# Patient Record
Sex: FEMALE | Race: WHITE | Employment: UNEMPLOYED
[De-identification: names, ages, dates, MRNs, and addresses within clinical notes are randomized per-mention and may not be internally consistent; named-entity substitution may affect disease eponyms.]

---

## 2017-01-06 ENCOUNTER — TELEPHONE (OUTPATIENT)
Dept: FAMILY MEDICINE CLINIC | Facility: CLINIC | Age: 52
End: 2017-01-06

## 2017-02-01 ENCOUNTER — OFFICE VISIT (OUTPATIENT)
Dept: FAMILY MEDICINE CLINIC | Facility: CLINIC | Age: 52
End: 2017-02-01

## 2017-02-01 VITALS
WEIGHT: 200.6 LBS | DIASTOLIC BLOOD PRESSURE: 88 MMHG | HEART RATE: 86 BPM | SYSTOLIC BLOOD PRESSURE: 132 MMHG | RESPIRATION RATE: 17 BRPM | TEMPERATURE: 98.5 F | HEIGHT: 64 IN | BODY MASS INDEX: 34.25 KG/M2

## 2017-02-01 DIAGNOSIS — G43.009 MIGRAINE WITHOUT AURA AND WITHOUT STATUS MIGRAINOSUS, NOT INTRACTABLE: ICD-10-CM

## 2017-02-01 DIAGNOSIS — E04.9 ENLARGED THYROID: ICD-10-CM

## 2017-02-01 DIAGNOSIS — F43.9 STRESS AT HOME: ICD-10-CM

## 2017-02-01 DIAGNOSIS — Z00.00 WELL ADULT EXAM: ICD-10-CM

## 2017-02-01 DIAGNOSIS — Z12.31 SCREENING MAMMOGRAM, ENCOUNTER FOR: ICD-10-CM

## 2017-02-01 DIAGNOSIS — K21.9 GASTROESOPHAGEAL REFLUX DISEASE WITHOUT ESOPHAGITIS: Primary | ICD-10-CM

## 2017-02-01 DIAGNOSIS — R00.2 PALPITATION: ICD-10-CM

## 2017-02-01 PROCEDURE — 99204 OFFICE O/P NEW MOD 45 MIN: CPT | Performed by: NURSE PRACTITIONER

## 2017-02-01 RX ORDER — OMEPRAZOLE 20 MG/1
20 TABLET, DELAYED RELEASE ORAL DAILY
Qty: 30 TABLET | Refills: 3 | Status: SHIPPED | OUTPATIENT
Start: 2017-02-01 | End: 2017-06-09 | Stop reason: SDUPTHER

## 2017-02-01 RX ORDER — CITALOPRAM 20 MG/1
20 TABLET ORAL DAILY
Qty: 30 TABLET | Refills: 3 | Status: SHIPPED | OUTPATIENT
Start: 2017-02-01 | End: 2017-06-08 | Stop reason: SDUPTHER

## 2017-02-01 RX ORDER — ATENOLOL 50 MG/1
TABLET ORAL
Qty: 30 TABLET | Refills: 3 | Status: SHIPPED | OUTPATIENT
Start: 2017-02-01 | End: 2017-06-08 | Stop reason: SDUPTHER

## 2017-02-01 ASSESSMENT — ENCOUNTER SYMPTOMS
PHOTOPHOBIA: 1
EYE PAIN: 0
COUGH: 0
WHEEZING: 0
TROUBLE SWALLOWING: 0
SHORTNESS OF BREATH: 0
DIARRHEA: 0
ABDOMINAL PAIN: 0
BACK PAIN: 0
VOMITING: 0
NAUSEA: 0
CONSTIPATION: 0
SORE THROAT: 0
EYE DISCHARGE: 0
RHINORRHEA: 0

## 2017-02-01 ASSESSMENT — PATIENT HEALTH QUESTIONNAIRE - PHQ9
1. LITTLE INTEREST OR PLEASURE IN DOING THINGS: 0
SUM OF ALL RESPONSES TO PHQ QUESTIONS 1-9: 0
2. FEELING DOWN, DEPRESSED OR HOPELESS: 0
SUM OF ALL RESPONSES TO PHQ9 QUESTIONS 1 & 2: 0

## 2017-04-06 ENCOUNTER — OFFICE VISIT (OUTPATIENT)
Dept: FAMILY MEDICINE CLINIC | Age: 52
End: 2017-04-06
Payer: MEDICARE

## 2017-04-06 VITALS
OXYGEN SATURATION: 98 % | RESPIRATION RATE: 16 BRPM | TEMPERATURE: 100 F | HEART RATE: 82 BPM | DIASTOLIC BLOOD PRESSURE: 80 MMHG | BODY MASS INDEX: 34.25 KG/M2 | HEIGHT: 64 IN | WEIGHT: 200.62 LBS | SYSTOLIC BLOOD PRESSURE: 130 MMHG

## 2017-04-06 DIAGNOSIS — J01.00 ACUTE NON-RECURRENT MAXILLARY SINUSITIS: ICD-10-CM

## 2017-04-06 DIAGNOSIS — B34.9 VIRAL SYNDROME: Primary | ICD-10-CM

## 2017-04-06 PROCEDURE — 99212 OFFICE O/P EST SF 10 MIN: CPT | Performed by: INTERNAL MEDICINE

## 2017-04-06 RX ORDER — AMOXICILLIN AND CLAVULANATE POTASSIUM 875; 125 MG/1; MG/1
1 TABLET, FILM COATED ORAL 2 TIMES DAILY
Qty: 20 TABLET | Refills: 0 | Status: SHIPPED | OUTPATIENT
Start: 2017-04-06 | End: 2017-04-10 | Stop reason: SINTOL

## 2017-04-06 RX ORDER — FEXOFENADINE HCL 180 MG/1
180 TABLET ORAL DAILY
Qty: 30 TABLET | Refills: 0 | Status: SHIPPED | OUTPATIENT
Start: 2017-04-06 | End: 2018-04-06

## 2017-04-06 ASSESSMENT — ENCOUNTER SYMPTOMS
DIARRHEA: 0
SHORTNESS OF BREATH: 0
RHINORRHEA: 0
COUGH: 1
VOMITING: 0
SORE THROAT: 0
SINUS PRESSURE: 1
NAUSEA: 1
ABDOMINAL PAIN: 0

## 2017-04-10 ENCOUNTER — TELEPHONE (OUTPATIENT)
Dept: FAMILY MEDICINE CLINIC | Age: 52
End: 2017-04-10

## 2017-04-10 DIAGNOSIS — J20.9 ACUTE BRONCHITIS, UNSPECIFIED ORGANISM: Primary | ICD-10-CM

## 2017-04-11 RX ORDER — CEPHALEXIN 500 MG/1
500 CAPSULE ORAL 2 TIMES DAILY
Qty: 14 CAPSULE | Refills: 0 | Status: SHIPPED | OUTPATIENT
Start: 2017-04-11 | End: 2017-04-18

## 2017-06-08 DIAGNOSIS — R00.2 PALPITATION: ICD-10-CM

## 2017-06-08 DIAGNOSIS — F43.9 STRESS AT HOME: ICD-10-CM

## 2017-06-09 DIAGNOSIS — K21.9 GASTROESOPHAGEAL REFLUX DISEASE WITHOUT ESOPHAGITIS: ICD-10-CM

## 2017-06-09 RX ORDER — OMEPRAZOLE 20 MG/1
20 TABLET, DELAYED RELEASE ORAL DAILY
Qty: 30 TABLET | Refills: 2 | Status: SHIPPED | OUTPATIENT
Start: 2017-06-09 | End: 2017-07-27 | Stop reason: SDUPTHER

## 2017-06-09 RX ORDER — CITALOPRAM 20 MG/1
20 TABLET ORAL DAILY
Qty: 30 TABLET | Refills: 2 | Status: SHIPPED | OUTPATIENT
Start: 2017-06-09 | End: 2017-10-03 | Stop reason: SDUPTHER

## 2017-06-09 RX ORDER — ATENOLOL 50 MG/1
50 TABLET ORAL DAILY
Qty: 30 TABLET | Refills: 2 | Status: SHIPPED | OUTPATIENT
Start: 2017-06-09 | End: 2017-10-10 | Stop reason: SDUPTHER

## 2017-07-27 DIAGNOSIS — K21.9 GASTROESOPHAGEAL REFLUX DISEASE WITHOUT ESOPHAGITIS: ICD-10-CM

## 2017-07-28 RX ORDER — NICOTINE POLACRILEX 4 MG/1
20 GUM, CHEWING ORAL DAILY
Qty: 30 TABLET | Refills: 2 | Status: SHIPPED | OUTPATIENT
Start: 2017-07-28 | End: 2017-10-30 | Stop reason: DRUGHIGH

## 2017-07-31 DIAGNOSIS — K21.9 GASTROESOPHAGEAL REFLUX DISEASE WITHOUT ESOPHAGITIS: ICD-10-CM

## 2017-07-31 RX ORDER — OMEPRAZOLE 20 MG/1
20 CAPSULE, DELAYED RELEASE ORAL DAILY
Qty: 30 CAPSULE | Refills: 2 | Status: SHIPPED | OUTPATIENT
Start: 2017-07-31 | End: 2017-10-30 | Stop reason: SDUPTHER

## 2017-10-03 DIAGNOSIS — F43.9 STRESS AT HOME: ICD-10-CM

## 2017-10-03 RX ORDER — CITALOPRAM 20 MG/1
20 TABLET ORAL DAILY
Qty: 30 TABLET | Refills: 1 | Status: SHIPPED | OUTPATIENT
Start: 2017-10-03 | End: 2017-12-18 | Stop reason: SDUPTHER

## 2017-10-03 NOTE — TELEPHONE ENCOUNTER
Last OV 2/1, Last refill Celexa 6/9/17.  BP  Health Maintenance   Topic Date Due    Lipid screen  08/10/2007    Breast cancer screen  08/10/2017    Colon cancer screen colonoscopy  08/10/2017    Flu vaccine (1) 09/01/2017    DTaP/Tdap/Td vaccine (1 - Tdap) 04/06/2018 (Originally 8/10/1986)    Cervical cancer screen  04/06/2018 (Originally 8/10/1988)    Diabetes screen  04/06/2018 (Originally 8/10/2007)    HIV screen  04/06/2018 (Originally 8/10/1982)             (applicable per patient's age: Cancer Screenings, Depression Screening, Fall Risk Screening, Immunizations)    No results found for: LABA1C, LABMICR, LDLCHOLESTEROL, LDLCALC, AST, ALT, BUN   (goal A1C is < 7)   (goal LDL is <100) need 30-50% reduction from baseline     BP Readings from Last 3 Encounters:   04/06/17 130/80   02/01/17 132/88   02/03/16 120/80    (goal /80)      All Future Testing planned in [facility]:  Lab Frequency Next Occurrence   Vitamin D 25 Hydroxy Once 2/1/2017   US Thyroid Once 2/1/2017   TSH With Reflex Ft4 Once 2/1/2017   JESSIE Digital Screen Bilateral Once 3/3/2017       Next Visit Date:  Future Appointments  Date Time Provider Delia Steward   10/11/2017 1:10 PM MAYLIN Huizar [facility]            Patient Active Problem List:     Palpitation     Anxiety and depression     Gastroesophageal reflux disease without esophagitis     Migraine without aura and without status migrainosus, not intractable     Stress at home

## 2017-10-10 DIAGNOSIS — R00.2 PALPITATION: ICD-10-CM

## 2017-10-11 RX ORDER — ATENOLOL 50 MG/1
50 TABLET ORAL DAILY
Qty: 30 TABLET | Refills: 0 | Status: SHIPPED | OUTPATIENT
Start: 2017-10-11 | End: 2017-12-18 | Stop reason: SDUPTHER

## 2017-10-11 NOTE — TELEPHONE ENCOUNTER
Last OV 2/1, Last refill Atenolol 6/9.  BP  Health Maintenance   Topic Date Due    Lipid screen  08/10/2007    Breast cancer screen  08/10/2017    Colon cancer screen colonoscopy  08/10/2017    Flu vaccine (1) 09/01/2017    DTaP/Tdap/Td vaccine (1 - Tdap) 04/06/2018 (Originally 8/10/1986)    Cervical cancer screen  04/06/2018 (Originally 8/10/1988)    Diabetes screen  04/06/2018 (Originally 8/10/2007)    HIV screen  04/06/2018 (Originally 8/10/1982)             (applicable per patient's age: Cancer Screenings, Depression Screening, Fall Risk Screening, Immunizations)    No results found for: LABA1C, LABMICR, LDLCHOLESTEROL, LDLCALC, AST, ALT, BUN   (goal A1C is < 7)   (goal LDL is <100) need 30-50% reduction from baseline     BP Readings from Last 3 Encounters:   04/06/17 130/80   02/01/17 132/88   02/03/16 120/80    (goal /80)      All Future Testing planned in [facility]:  Lab Frequency Next Occurrence   Vitamin D 25 Hydroxy Once 02/01/2017   US Thyroid Once 02/01/2017   TSH With Reflex Ft4 Once 02/01/2017   JESSIE Digital Screen Bilateral Once 03/03/2017       Next Visit Date:  Future Appointments  Date Time Provider Delia Steward   10/17/2017 9:00 AM MAYLIN Hdez [facility]            Patient Active Problem List:     Palpitation     Anxiety and depression     Gastroesophageal reflux disease without esophagitis     Migraine without aura and without status migrainosus, not intractable     Stress at home

## 2017-10-30 ENCOUNTER — OFFICE VISIT (OUTPATIENT)
Dept: FAMILY MEDICINE CLINIC | Age: 52
End: 2017-10-30
Payer: MEDICARE

## 2017-10-30 VITALS
DIASTOLIC BLOOD PRESSURE: 84 MMHG | BODY MASS INDEX: 33.63 KG/M2 | HEIGHT: 64 IN | TEMPERATURE: 98.4 F | HEART RATE: 71 BPM | OXYGEN SATURATION: 97 % | RESPIRATION RATE: 20 BRPM | SYSTOLIC BLOOD PRESSURE: 137 MMHG | WEIGHT: 197 LBS

## 2017-10-30 DIAGNOSIS — K21.9 GASTROESOPHAGEAL REFLUX DISEASE WITHOUT ESOPHAGITIS: ICD-10-CM

## 2017-10-30 DIAGNOSIS — F41.9 ANXIETY AND DEPRESSION: Primary | ICD-10-CM

## 2017-10-30 DIAGNOSIS — G43.009 MIGRAINE WITHOUT AURA AND WITHOUT STATUS MIGRAINOSUS, NOT INTRACTABLE: ICD-10-CM

## 2017-10-30 DIAGNOSIS — F32.A ANXIETY AND DEPRESSION: Primary | ICD-10-CM

## 2017-10-30 PROCEDURE — G8427 DOCREV CUR MEDS BY ELIG CLIN: HCPCS | Performed by: NURSE PRACTITIONER

## 2017-10-30 PROCEDURE — 99214 OFFICE O/P EST MOD 30 MIN: CPT | Performed by: NURSE PRACTITIONER

## 2017-10-30 PROCEDURE — G8417 CALC BMI ABV UP PARAM F/U: HCPCS | Performed by: NURSE PRACTITIONER

## 2017-10-30 PROCEDURE — 3017F COLORECTAL CA SCREEN DOC REV: CPT | Performed by: NURSE PRACTITIONER

## 2017-10-30 PROCEDURE — G8484 FLU IMMUNIZE NO ADMIN: HCPCS | Performed by: NURSE PRACTITIONER

## 2017-10-30 PROCEDURE — 1036F TOBACCO NON-USER: CPT | Performed by: NURSE PRACTITIONER

## 2017-10-30 ASSESSMENT — ENCOUNTER SYMPTOMS
EYE PAIN: 0
DIARRHEA: 0
COUGH: 0
SHORTNESS OF BREATH: 0
TROUBLE SWALLOWING: 0
HEARTBURN: 1
SORE THROAT: 0
VOMITING: 0
ABDOMINAL PAIN: 0
CONSTIPATION: 0
NAUSEA: 0
WHEEZING: 0
BACK PAIN: 0
PHOTOPHOBIA: 0
EYE DISCHARGE: 0
RHINORRHEA: 0

## 2017-10-30 NOTE — PROGRESS NOTES
She appears well-developed and well-nourished. No distress. HENT:   Head: Normocephalic and atraumatic. Right Ear: Tympanic membrane and external ear normal.   Left Ear: Tympanic membrane and external ear normal.   Nose: Nose normal.   Mouth/Throat: Uvula is midline, oropharynx is clear and moist and mucous membranes are normal. No posterior oropharyngeal edema or posterior oropharyngeal erythema. Eyes: Conjunctivae and EOM are normal. Pupils are equal, round, and reactive to light. Right eye exhibits no discharge. Left eye exhibits no discharge. Neck: Normal range of motion. Neck supple. Cardiovascular: Normal rate, regular rhythm and normal heart sounds. Pulmonary/Chest: Effort normal and breath sounds normal. No respiratory distress. She has no wheezes. She has no rales. Abdominal: Soft. Bowel sounds are normal. She exhibits no distension. There is no tenderness. There is no guarding. Musculoskeletal: She exhibits no edema. No red or swollen joints   Lymphadenopathy:     She has no cervical adenopathy. Neurological: She is alert and oriented to person, place, and time. Skin: Skin is warm and dry. No rash noted. Psychiatric: She has a normal mood and affect. Her behavior is normal.   Nursing note and vitals reviewed. Assessment:      1. Anxiety and depression     2. Gastroesophageal reflux disease without esophagitis  omeprazole (PRILOSEC OTC) 20 MG tablet   3.  Migraine without aura and without status migrainosus, not intractable  butalbital-acetaminophen-caffeine-codeine (FIORICET WITH CODEINE) -02-30 MG per capsule    ibuprofen (ADVIL;MOTRIN) 600 MG tablet           Plan:      Continue medications as prescribed  Will increase omeprazole to twice daily per patient request. Advised should only use for short term (about 8 weeks) then return to once daily  Ibuprofen and Fioricet as needed for migraines  Refused screening colonoscopy and Fit testing  Declined screening mammogram

## 2017-10-31 RX ORDER — IBUPROFEN 600 MG/1
600 TABLET ORAL EVERY 6 HOURS PRN
Qty: 90 TABLET | Refills: 1 | Status: SHIPPED | OUTPATIENT
Start: 2017-10-31 | End: 2019-01-07 | Stop reason: SDUPTHER

## 2017-10-31 RX ORDER — BUTALBITAL, ACETAMINOPHEN, CAFFEINE AND CODEINE PHOSPHATE 50; 325; 40; 30 MG/1; MG/1; MG/1; MG/1
CAPSULE ORAL
Qty: 30 CAPSULE | Refills: 0 | Status: SHIPPED | OUTPATIENT
Start: 2017-10-31 | End: 2019-01-09 | Stop reason: SDUPTHER

## 2017-10-31 RX ORDER — OMEPRAZOLE 20 MG/1
20 TABLET, DELAYED RELEASE ORAL 2 TIMES DAILY PRN
Qty: 60 TABLET | Refills: 2 | Status: SHIPPED | OUTPATIENT
Start: 2017-10-31 | End: 2018-04-25 | Stop reason: SDUPTHER

## 2017-12-18 DIAGNOSIS — R00.2 PALPITATION: ICD-10-CM

## 2017-12-18 DIAGNOSIS — F43.9 STRESS AT HOME: ICD-10-CM

## 2017-12-18 RX ORDER — CITALOPRAM 20 MG/1
20 TABLET ORAL DAILY
Qty: 30 TABLET | Refills: 5 | Status: SHIPPED | OUTPATIENT
Start: 2017-12-18 | End: 2018-06-29 | Stop reason: SDUPTHER

## 2017-12-18 RX ORDER — ATENOLOL 50 MG/1
50 TABLET ORAL DAILY
Qty: 30 TABLET | Refills: 5 | Status: SHIPPED | OUTPATIENT
Start: 2017-12-18 | End: 2018-06-29 | Stop reason: SDUPTHER

## 2017-12-18 NOTE — TELEPHONE ENCOUNTER
LOV: 10/30/17  RTO: 6 months  LR: 10/03/17    Health Maintenance   Topic Date Due    Lipid screen  08/10/2007    Breast cancer screen  08/10/2017    Colon cancer screen colonoscopy  08/10/2017    DTaP/Tdap/Td vaccine (1 - Tdap) 04/06/2018 (Originally 8/10/1986)    Cervical cancer screen  04/06/2018 (Originally 8/10/1988)    Diabetes screen  04/06/2018 (Originally 8/10/2007)    HIV screen  04/06/2018 (Originally 8/10/1982)    Flu vaccine (1) 10/01/2018 (Originally 9/1/2017)             (applicable per patient's age: Cancer Screenings, Depression Screening, Fall Risk Screening, Immunizations)    No results found for: LABA1C, LABMICR, LDLCHOLESTEROL, LDLCALC, AST, ALT, BUN   (goal A1C is < 7)   (goal LDL is <100) need 30-50% reduction from baseline     BP Readings from Last 3 Encounters:   10/30/17 137/84   04/06/17 130/80   02/01/17 132/88    (goal /80)      All Future Testing planned in [facility]:  Lab Frequency Next Occurrence   Vitamin D 25 Hydroxy Once 02/01/2017   US Thyroid Once 02/01/2017   TSH With Reflex Ft4 Once 02/01/2017   JESSIE Digital Screen Bilateral Once 03/03/2017       Next Visit Date:  No future appointments.          Patient Active Problem List:     Palpitation     Anxiety and depression     Gastroesophageal reflux disease without esophagitis     Migraine without aura and without status migrainosus, not intractable     Stress at home

## 2018-04-25 DIAGNOSIS — K21.9 GASTROESOPHAGEAL REFLUX DISEASE WITHOUT ESOPHAGITIS: ICD-10-CM

## 2018-04-27 RX ORDER — OMEPRAZOLE 20 MG/1
20 TABLET, DELAYED RELEASE ORAL 2 TIMES DAILY PRN
Qty: 60 TABLET | Refills: 5 | Status: SHIPPED | OUTPATIENT
Start: 2018-04-27 | End: 2019-01-09 | Stop reason: SDUPTHER

## 2018-05-29 ENCOUNTER — HOSPITAL ENCOUNTER (OUTPATIENT)
Age: 53
Setting detail: SPECIMEN
Discharge: HOME | End: 2018-05-29
Payer: MEDICARE

## 2018-05-29 DIAGNOSIS — Z00.00 WELL ADULT EXAM: ICD-10-CM

## 2018-05-29 DIAGNOSIS — K21.9 GASTROESOPHAGEAL REFLUX DISEASE WITHOUT ESOPHAGITIS: Primary | ICD-10-CM

## 2018-05-29 DIAGNOSIS — K21.9 GASTROESOPHAGEAL REFLUX DISEASE WITHOUT ESOPHAGITIS: ICD-10-CM

## 2018-05-29 LAB
ALBUMIN SERPL-MCNC: 4.2 G/DL (ref 3.5–5.2)
ALBUMIN/GLOBULIN RATIO: 1.6 (ref 1–2.5)
ALP BLD-CCNC: 46 U/L (ref 35–104)
ALT SERPL-CCNC: 19 U/L (ref 5–33)
ANION GAP SERPL CALCULATED.3IONS-SCNC: 16 MMOL/L (ref 9–17)
AST SERPL-CCNC: 24 U/L
BILIRUB SERPL-MCNC: 0.45 MG/DL (ref 0.3–1.2)
BUN BLDV-MCNC: 8 MG/DL (ref 6–20)
BUN/CREAT BLD: NORMAL (ref 9–20)
CALCIUM SERPL-MCNC: 9.3 MG/DL (ref 8.6–10.4)
CHLORIDE BLD-SCNC: 103 MMOL/L (ref 98–107)
CHOLESTEROL/HDL RATIO: 7.8
CHOLESTEROL: 256 MG/DL
CO2: 23 MMOL/L (ref 20–31)
CREAT SERPL-MCNC: 0.61 MG/DL (ref 0.5–0.9)
GFR AFRICAN AMERICAN: >60 ML/MIN
GFR NON-AFRICAN AMERICAN: >60 ML/MIN
GFR SERPL CREATININE-BSD FRML MDRD: NORMAL ML/MIN/{1.73_M2}
GFR SERPL CREATININE-BSD FRML MDRD: NORMAL ML/MIN/{1.73_M2}
GLUCOSE BLD-MCNC: 87 MG/DL (ref 70–99)
HCT VFR BLD CALC: 42.2 % (ref 36.3–47.1)
HDLC SERPL-MCNC: 33 MG/DL
HEMOGLOBIN: 13.3 G/DL (ref 11.9–15.1)
LDL CHOLESTEROL DIRECT: 127 MG/DL
LDL CHOLESTEROL: ABNORMAL MG/DL (ref 0–130)
MCH RBC QN AUTO: 29.4 PG (ref 25.2–33.5)
MCHC RBC AUTO-ENTMCNC: 31.5 G/DL (ref 28.4–34.8)
MCV RBC AUTO: 93.2 FL (ref 82.6–102.9)
NRBC AUTOMATED: 0 PER 100 WBC
PDW BLD-RTO: 14.4 % (ref 11.8–14.4)
PLATELET # BLD: 236 K/UL (ref 138–453)
PMV BLD AUTO: 9.3 FL (ref 8.1–13.5)
POTASSIUM SERPL-SCNC: 3.7 MMOL/L (ref 3.7–5.3)
RBC # BLD: 4.53 M/UL (ref 3.95–5.11)
SODIUM BLD-SCNC: 142 MMOL/L (ref 135–144)
TOTAL PROTEIN: 6.9 G/DL (ref 6.4–8.3)
TRIGL SERPL-MCNC: 575 MG/DL
TSH SERPL DL<=0.05 MIU/L-ACNC: 1.15 MIU/L (ref 0.3–5)
VITAMIN B-12: 1100 PG/ML (ref 232–1245)
VLDLC SERPL CALC-MCNC: ABNORMAL MG/DL (ref 1–30)
WBC # BLD: 5.2 K/UL (ref 3.5–11.3)

## 2018-06-29 DIAGNOSIS — R00.2 PALPITATION: ICD-10-CM

## 2018-06-29 DIAGNOSIS — F43.9 STRESS AT HOME: ICD-10-CM

## 2018-06-29 NOTE — TELEPHONE ENCOUNTER
LOV 10-30-17  ONE[facility] message sent to patient to schedule follow up  [facility] 12-18-17      Health Maintenance   Topic Date Due    HIV screen  08/10/1982    DTaP/Tdap/Td vaccine (1 - Tdap) 08/10/1986    Cervical cancer screen  08/10/1988    Breast cancer screen  08/10/2017    Shingles Vaccine (1 of 2 - 2 Dose Series) 08/10/2017    Colon cancer screen colonoscopy  08/10/2017    Flu vaccine (Season Ended) 10/01/2018 (Originally 9/1/2018)    Lipid screen  05/29/2023             (applicable per patient's age: Cancer Screenings, Depression Screening, Fall Risk Screening, Immunizations)    LDL Cholesterol (mg/dL)   Date Value   05/29/2018          AST (U/L)   Date Value   05/29/2018 24     ALT (U/L)   Date Value   05/29/2018 19     BUN (mg/dL)   Date Value   05/29/2018 8      (goal A1C is < 7)   (goal LDL is <100) need 30-50% reduction from baseline     BP Readings from Last 3 Encounters:   10/30/17 137/84   04/06/17 130/80   02/01/17 132/88    (goal /80)      All Future Testing planned in [facility]:  Lab Frequency Next Occurrence       Next Visit Date:  No future appointments.          Patient Active Problem List:     Palpitation     Anxiety and depression     Gastroesophageal reflux disease without esophagitis     Migraine without aura and without status migrainosus, not intractable     Stress at home

## 2018-07-02 RX ORDER — CITALOPRAM 20 MG/1
20 TABLET ORAL DAILY
Qty: 30 TABLET | Refills: 5 | Status: SHIPPED | OUTPATIENT
Start: 2018-07-02 | End: 2019-01-07 | Stop reason: SDUPTHER

## 2018-07-02 RX ORDER — ATENOLOL 50 MG/1
50 TABLET ORAL DAILY
Qty: 30 TABLET | Refills: 5 | Status: SHIPPED | OUTPATIENT
Start: 2018-07-02 | End: 2019-01-07 | Stop reason: SDUPTHER

## 2018-07-30 ENCOUNTER — TELEPHONE (OUTPATIENT)
Dept: FAMILY MEDICINE CLINIC | Age: 53
End: 2018-07-30

## 2018-09-11 ENCOUNTER — TELEPHONE (OUTPATIENT)
Dept: FAMILY MEDICINE CLINIC | Age: 53
End: 2018-09-11

## 2019-01-09 ENCOUNTER — OFFICE VISIT (OUTPATIENT)
Dept: FAMILY MEDICINE CLINIC | Age: 54
End: 2019-01-09
Payer: MEDICARE

## 2019-01-09 VITALS
SYSTOLIC BLOOD PRESSURE: 126 MMHG | RESPIRATION RATE: 16 BRPM | BODY MASS INDEX: 34.33 KG/M2 | HEART RATE: 80 BPM | DIASTOLIC BLOOD PRESSURE: 86 MMHG | WEIGHT: 200 LBS | TEMPERATURE: 97.8 F

## 2019-01-09 DIAGNOSIS — K21.9 GASTROESOPHAGEAL REFLUX DISEASE WITHOUT ESOPHAGITIS: ICD-10-CM

## 2019-01-09 DIAGNOSIS — R00.2 PALPITATION: ICD-10-CM

## 2019-01-09 DIAGNOSIS — E78.2 MIXED HYPERLIPIDEMIA: ICD-10-CM

## 2019-01-09 DIAGNOSIS — M25.50 POLYARTHRALGIA: ICD-10-CM

## 2019-01-09 DIAGNOSIS — G43.109 MIGRAINE WITH AURA AND WITHOUT STATUS MIGRAINOSUS, NOT INTRACTABLE: Primary | ICD-10-CM

## 2019-01-09 PROCEDURE — G8484 FLU IMMUNIZE NO ADMIN: HCPCS | Performed by: NURSE PRACTITIONER

## 2019-01-09 PROCEDURE — 1036F TOBACCO NON-USER: CPT | Performed by: NURSE PRACTITIONER

## 2019-01-09 PROCEDURE — 3017F COLORECTAL CA SCREEN DOC REV: CPT | Performed by: NURSE PRACTITIONER

## 2019-01-09 PROCEDURE — G8427 DOCREV CUR MEDS BY ELIG CLIN: HCPCS | Performed by: NURSE PRACTITIONER

## 2019-01-09 PROCEDURE — 99214 OFFICE O/P EST MOD 30 MIN: CPT | Performed by: NURSE PRACTITIONER

## 2019-01-09 PROCEDURE — G8417 CALC BMI ABV UP PARAM F/U: HCPCS | Performed by: NURSE PRACTITIONER

## 2019-01-09 RX ORDER — ATENOLOL 50 MG/1
50 TABLET ORAL DAILY
Qty: 90 TABLET | Refills: 1 | Status: SHIPPED | OUTPATIENT
Start: 2019-01-09 | End: 2019-07-02

## 2019-01-09 RX ORDER — BUTALBITAL, ACETAMINOPHEN, CAFFEINE AND CODEINE PHOSPHATE 50; 325; 40; 30 MG/1; MG/1; MG/1; MG/1
CAPSULE ORAL
Qty: 30 CAPSULE | Refills: 0 | Status: SHIPPED | OUTPATIENT
Start: 2019-01-09 | End: 2020-01-08

## 2019-01-09 RX ORDER — MELOXICAM 15 MG/1
15 TABLET ORAL DAILY
Qty: 30 TABLET | Refills: 3 | Status: SHIPPED | OUTPATIENT
Start: 2019-01-09 | End: 2019-01-14 | Stop reason: SINTOL

## 2019-01-09 RX ORDER — NIACIN 500 MG
500 TABLET ORAL
COMMUNITY

## 2019-01-09 RX ORDER — OMEPRAZOLE 20 MG/1
20 TABLET, DELAYED RELEASE ORAL DAILY
Qty: 90 TABLET | Refills: 1 | Status: SHIPPED | OUTPATIENT
Start: 2019-01-09 | End: 2020-03-03 | Stop reason: ALTCHOICE

## 2019-01-09 ASSESSMENT — ENCOUNTER SYMPTOMS
WHEEZING: 0
RHINORRHEA: 1
SHORTNESS OF BREATH: 0
BACK PAIN: 0
ABDOMINAL PAIN: 0
COUGH: 0
EYE DISCHARGE: 0
CONSTIPATION: 0
SORE THROAT: 0
PHOTOPHOBIA: 1
VOMITING: 0
NAUSEA: 0
EYE PAIN: 0
DIARRHEA: 0

## 2019-01-09 ASSESSMENT — PATIENT HEALTH QUESTIONNAIRE - PHQ9
SUM OF ALL RESPONSES TO PHQ QUESTIONS 1-9: 0
SUM OF ALL RESPONSES TO PHQ QUESTIONS 1-9: 0
1. LITTLE INTEREST OR PLEASURE IN DOING THINGS: 0
2. FEELING DOWN, DEPRESSED OR HOPELESS: 0
SUM OF ALL RESPONSES TO PHQ9 QUESTIONS 1 & 2: 0

## 2019-02-08 DIAGNOSIS — F43.9 STRESS AT HOME: ICD-10-CM

## 2019-02-11 RX ORDER — CITALOPRAM 20 MG/1
20 TABLET ORAL DAILY
Qty: 30 TABLET | Refills: 5 | Status: SHIPPED | OUTPATIENT
Start: 2019-02-11 | End: 2019-09-09 | Stop reason: SDUPTHER

## 2019-05-30 ENCOUNTER — HOSPITAL ENCOUNTER (OUTPATIENT)
Age: 54
Setting detail: SPECIMEN
Discharge: HOME | End: 2019-05-30
Payer: MEDICARE

## 2019-05-30 DIAGNOSIS — M25.50 POLYARTHRALGIA: ICD-10-CM

## 2019-05-30 DIAGNOSIS — R00.2 PALPITATION: ICD-10-CM

## 2019-05-30 LAB
ALBUMIN SERPL-MCNC: 4.3 G/DL (ref 3.5–5.2)
ALBUMIN/GLOBULIN RATIO: 1.5 (ref 1–2.5)
ALP BLD-CCNC: 49 U/L (ref 35–104)
ALT SERPL-CCNC: 18 U/L (ref 5–33)
ANION GAP SERPL CALCULATED.3IONS-SCNC: 16 MMOL/L (ref 9–17)
AST SERPL-CCNC: 24 U/L
BILIRUB SERPL-MCNC: 0.93 MG/DL (ref 0.3–1.2)
BUN BLDV-MCNC: 15 MG/DL (ref 6–20)
BUN/CREAT BLD: NORMAL (ref 9–20)
CALCIUM SERPL-MCNC: 9.5 MG/DL (ref 8.6–10.4)
CHLORIDE BLD-SCNC: 103 MMOL/L (ref 98–107)
CHOLESTEROL/HDL RATIO: 5.9
CHOLESTEROL: 223 MG/DL
CO2: 24 MMOL/L (ref 20–31)
CREAT SERPL-MCNC: 0.55 MG/DL (ref 0.5–0.9)
GFR AFRICAN AMERICAN: >60 ML/MIN
GFR NON-AFRICAN AMERICAN: >60 ML/MIN
GFR SERPL CREATININE-BSD FRML MDRD: NORMAL ML/MIN/{1.73_M2}
GFR SERPL CREATININE-BSD FRML MDRD: NORMAL ML/MIN/{1.73_M2}
GLUCOSE BLD-MCNC: 86 MG/DL (ref 70–99)
HDLC SERPL-MCNC: 38 MG/DL
LDL CHOLESTEROL: 127 MG/DL (ref 0–130)
POTASSIUM SERPL-SCNC: 3.7 MMOL/L (ref 3.7–5.3)
RHEUMATOID FACTOR: <10 IU/ML
SODIUM BLD-SCNC: 143 MMOL/L (ref 135–144)
TOTAL PROTEIN: 7.1 G/DL (ref 6.4–8.3)
TRIGL SERPL-MCNC: 290 MG/DL
VLDLC SERPL CALC-MCNC: ABNORMAL MG/DL (ref 1–30)

## 2019-05-31 LAB
ANTI-NUCLEAR ANTIBODY (ANA): NEGATIVE
SEDIMENTATION RATE, ERYTHROCYTE: 9 MM (ref 0–20)

## 2019-06-06 ENCOUNTER — TELEPHONE (OUTPATIENT)
Dept: FAMILY MEDICINE CLINIC | Age: 54
End: 2019-06-06

## 2019-06-06 ENCOUNTER — NURSE ONLY (OUTPATIENT)
Dept: FAMILY MEDICINE CLINIC | Age: 54
End: 2019-06-06
Payer: MEDICARE

## 2019-06-06 VITALS — RESPIRATION RATE: 16 BRPM | SYSTOLIC BLOOD PRESSURE: 115 MMHG | DIASTOLIC BLOOD PRESSURE: 68 MMHG | HEART RATE: 76 BPM

## 2019-06-06 DIAGNOSIS — R00.2 PALPITATION: Primary | ICD-10-CM

## 2019-06-06 DIAGNOSIS — I48.91 ATRIAL FIBRILLATION, UNSPECIFIED TYPE (HCC): ICD-10-CM

## 2019-06-06 PROCEDURE — 93000 ELECTROCARDIOGRAM COMPLETE: CPT | Performed by: NURSE PRACTITIONER

## 2019-06-06 PROCEDURE — 99211 OFF/OP EST MAY X REQ PHY/QHP: CPT | Performed by: NURSE PRACTITIONER

## 2019-06-06 NOTE — TELEPHONE ENCOUNTER
[facility] Trans[facility] Initial Follow Up Call    Outreach made within 2 business days of discharge: Yes    Patient: Buddy Zabala Patient : 1965   MRN: [id number]  Reason for Admission: No discharge information exists for this patient. Discharge Date:         Spoke with: Ramila    Discharge department/facility: [facility] Interactive Patient Contact:  Was patient able to fill all prescriptions: No: Left AMA  Was patient instructed to bring all medications to the follow-up visit: No: N/A  Is patient taking all medications as directed in the discharge summary? No  Does patient understand their discharge instructions: No: Left AMA. Still needs for Echo. Needs order  Does patient have questions or concerns that need addressed prior to 7-14 day follow up office visit: yes - Was not getting routine medications and [facility]. Left cause she had not got fed,was not getting her home medications. Follow up scheduled for 19 with Dr. Vanda Berumen. Needs appointment to get started on Medications was given a blood thinner 19 only.   Patient coming in today for nurse visit, for vitals, EKG, and possible initiate the blood thinners OV with Minor Goodell     Scheduled appointment with PCP within 7-14 days    Follow Up  Future Appointments   Date Time Provider Delia Steward   2019  1:45 PM Jacob Ulloa MD [id number] [facility]

## 2019-06-07 ENCOUNTER — OFFICE VISIT (OUTPATIENT)
Dept: FAMILY MEDICINE CLINIC | Age: 54
End: 2019-06-07
Payer: MEDICARE

## 2019-06-07 VITALS
SYSTOLIC BLOOD PRESSURE: 136 MMHG | WEIGHT: 204 LBS | BODY MASS INDEX: 35.02 KG/M2 | DIASTOLIC BLOOD PRESSURE: 88 MMHG | RESPIRATION RATE: 14 BRPM | TEMPERATURE: 98.7 F | HEART RATE: 76 BPM

## 2019-06-07 DIAGNOSIS — I48.91 ATRIAL FIBRILLATION, UNSPECIFIED TYPE (HCC): Primary | ICD-10-CM

## 2019-06-07 DIAGNOSIS — R06.02 SHORTNESS OF BREATH: ICD-10-CM

## 2019-06-07 PROCEDURE — G8417 CALC BMI ABV UP PARAM F/U: HCPCS | Performed by: NURSE PRACTITIONER

## 2019-06-07 PROCEDURE — G8427 DOCREV CUR MEDS BY ELIG CLIN: HCPCS | Performed by: NURSE PRACTITIONER

## 2019-06-07 PROCEDURE — 3017F COLORECTAL CA SCREEN DOC REV: CPT | Performed by: NURSE PRACTITIONER

## 2019-06-07 PROCEDURE — 99214 OFFICE O/P EST MOD 30 MIN: CPT | Performed by: NURSE PRACTITIONER

## 2019-06-07 PROCEDURE — 1036F TOBACCO NON-USER: CPT | Performed by: NURSE PRACTITIONER

## 2019-06-07 RX ORDER — DILTIAZEM HYDROCHLORIDE 120 MG/1
120 CAPSULE, COATED, EXTENDED RELEASE ORAL DAILY
Qty: 30 CAPSULE | Refills: 0 | Status: SHIPPED | OUTPATIENT
Start: 2019-06-07 | End: 2019-07-02

## 2019-06-07 ASSESSMENT — ENCOUNTER SYMPTOMS
CHEST TIGHTNESS: 0
EYE DISCHARGE: 0
SORE THROAT: 0
SHORTNESS OF BREATH: 0
EYE REDNESS: 0
ABDOMINAL DISTENTION: 0
BLOOD IN STOOL: 0
SINUS PRESSURE: 0
ABDOMINAL PAIN: 0
DIARRHEA: 0
EYE PAIN: 0
BACK PAIN: 0
NAUSEA: 0
RHINORRHEA: 1
WHEEZING: 0
VOMITING: 0
COUGH: 1

## 2019-06-07 NOTE — PROGRESS NOTES
side, and 2+ on the left side. Posterior tibial pulses are 2+ on the right side, and 2+ on the left side. Pulmonary/Chest: Effort normal and breath sounds normal. No respiratory distress. She has no wheezes. She has no rhonchi. Abdominal: Soft. Bowel sounds are normal. She exhibits no distension. There is no hepatosplenomegaly. There is no tenderness. There is no rebound and no CVA tenderness. protuberant   Musculoskeletal: Normal range of motion. She exhibits no edema or tenderness. Lymphadenopathy:     She has no cervical adenopathy. Neurological: She is alert and oriented to person, place, and time. She has normal strength. She exhibits normal muscle tone. Coordination normal.   Skin: Skin is warm, dry and intact. No rash noted. No erythema. Psychiatric: She has a normal mood and affect. Her speech is normal and behavior is normal. Thought content normal.   Nursing note and vitals reviewed. Assessment:      Diagnosis Orders   1. Atrial fibrillation, unspecified type (HCC)  diltiazem (CARDIZEM CD) 120 MG extended release capsule    ECHO Complete 2D W Doppler W Color   2.  Shortness of breath  diltiazem (CARDIZEM CD) 120 MG extended release capsule    ECHO Complete 2D W Doppler W Color       Lab Results   Component Value Date    WBC 5.2 05/29/2018    HGB 13.3 05/29/2018    HCT 42.2 05/29/2018    MCV 93.2 05/29/2018     05/29/2018       Lab Results   Component Value Date     05/30/2019    K 3.7 05/30/2019     05/30/2019    CO2 24 05/30/2019    BUN 15 05/30/2019    CREATININE 0.55 05/30/2019    GLUCOSE 86 05/30/2019    CALCIUM 9.5 05/30/2019    PROT 7.1 05/30/2019    LABALBU 4.3 05/30/2019    BILITOT 0.93 05/30/2019    ALKPHOS 49 05/30/2019    AST 24 05/30/2019    ALT 18 05/30/2019    LABGLOM >60 05/30/2019    GFRAA >60 05/30/2019       Lab Results   Component Value Date    CHOL 223 (H) 05/30/2019    CHOL 256 (H) 05/29/2018     Lab Results   Component Value Date    TRIG 290 (H) 05/30/2019    TRIG 575 (H) 05/29/2018     Lab Results   Component Value Date    HDL 38 (L) 05/30/2019    HDL 33 (L) 05/29/2018     Lab Results   Component Value Date    LDLCHOLESTEROL 127 05/30/2019    LDLCHOLESTEROL      05/29/2018     Lab Results   Component Value Date    VLDL NOT REPORTED (H) 05/30/2019    VLDL NOT REPORTED 05/29/2018     Lab Results   Component Value Date    CHOLHDLRATIO 5.9 (H) 05/30/2019    CHOLHDLRATIO 7.8 (H) 05/29/2018       Plan:     Reviewed hospital records  SR today  Continue Xarelto until cardiology evaluation  Start oral cardizem as prescribed  Complete echocardiogram - will fax to Dr Aurelia Hancock office   Monitor blood pressure at home   Recommend continue current medications, healthy diet, and regular exercise. Monitor for worsening symptoms   Call with concerns   Return if symptoms worsen or fail to improve. Sabrina Batista received counseling on the following healthy behaviors: nutrition and medication adherence  Reviewed prior labs and health maintenance  Continue current medications, diet and exercise. Discussed use, benefit, and side effects of prescribed medications. Barriers to medication compliance addressed. Patient given educational materials - see patient instructions  Was a self-tracking handout given in paper form or via [employer]? No    Requested Prescriptions     Signed Prescriptions Disp Refills    diltiazem (CARDIZEM CD) 120 MG extended release capsule 30 capsule 0     Sig: Take 1 capsule by mouth daily       All patient questions answered. Patient voiced understanding. Quality Measures    Body mass index is 35.02 kg/m². Elevated. Weight control planned discussed Healthy diet and regular exercise. BP: 136/88 Blood pressure is normal. Treatment plan consists of No treatment change needed.     Lab Results   Component Value Date    LDLCHOLESTEROL 127 05/30/2019    LDLDIRECT 127 (H) 05/29/2018    (goal LDL reduction with dx if diabetes is 50% LDL reduction)      PHQ Scores 1/9/2019 2/1/2017   PHQ2 Score 0 0   PHQ9 Score 0 0     Interpretation of Total Score Depression Severity: 1-4 = Minimal depression, 5-9 = Mild depression, 10-14 = Moderate depression, 15-19 = Moderately severe depression, 20-27 = Severe depression              Electronically signed by CRISTIAN Negrete CNP on 6/7/2019 at 12:10 PM

## 2019-06-07 NOTE — PATIENT INSTRUCTIONS
Patient Education        Deciding Between Electrical Cardioversion and Rate Control Medicines for Atrial Fibrillation  How can you decide between electrical cardioversion and rate control medicines for atrial fibrillation? What is atrial fibrillation? Atrial fibrillation (say \"AY-tree-juan carlos dcm-mleq-QSS-shun\") is a kind of uneven heartbeat. It can make you feel lightheaded and dizzy. You may feel weak. It also can make you more likely to have a stroke. Electrical cardioversion can return your heart to a normal rhythm. First you'll get medicines to make you sleepy and control pain. Then your doctor will use patches to send an electric current to your heart. This resets the rhythm of your heart. Not everyone with atrial fibrillation needs this treatment. For some people, taking medicines may be better. Most people can live with an uneven heartbeat. It just has to be kept under control so the heart does not beat too fast.  Use this information to help you and your doctor decide which treatment to choose for atrial fibrillation. What are lee points about this decision? · Electrical cardioversion can return your heart to a normal rhythm. But the problem can come back. The longer you have had atrial fibrillation, the more likely it is to come back after this treatment. · Cardioversion may not work as well when an uneven heartbeat is caused by another heart disease, such as heart failure. · If your symptoms bother you a lot, you may want to try cardioversion. But even if it works, you may still need to take blood thinners to prevent a stroke. · If you don't have symptoms, or if they don't bother you much, you can try medicines to slow your heart rate. And you can take blood thinners to prevent a stroke. · Cardioversion does have risks, such as stroke. Discuss the risks with your doctor. Make sure you understand them. · You may have more than one heart problem.  Cardioversion doesn't work as well if you have more than one heart problem. Why might you choose electrical cardioversion? · It restores the normal heart rhythm for most people. · The idea of having an electric shock does not bother you. · Your symptoms bother you a lot. · You have had atrial fibrillation just one time. · You do not have other heart problems. · You may not have to take as many medicines. Or you may not need to take them as long. Why might you choose rate-control medicines? · These medicines keep many people from having symptoms. · You prefer to take medicines rather than have an electric shock. · Your symptoms don't bother you much. · If these medicines don't work, you can still try electrical cardioversion. Your decision  Thinking about the facts and your feelings can help you make a decision that is right for you. Be sure you understand the benefits and risks of your options. And think about what else you need to do before you make the decision. Where can you learn more? Go to https://[employer].carpooling.com. org and sign in to your [employer] account. Enter Y865 in the In[employer] box to learn more about \"Deciding Between Electrical Cardioversion and Rate Control Medicines for Atrial Fibrillation. \"     If you do not have an account, please click on the \"Sign Up Now\" link. Current as of: July 22, 2018  Content Version: 12.0  © 4239-1512 [employer]. Care instructions adapted under license by [facility] ([facility]). If you have questions about a medical condition or this instruction, always ask your healthcare professional. [place] [address] any warranty or liability for your use of this information. Patient Education        Shortness of Breath: Care Instructions  Your Care Instructions  Shortness of breath has many causes. Sometimes conditions such as anxiety can lead to shortness of breath. Some people get mild shortness of breath when they exercise.  Trouble breathing also can be a symptom of a serious problem, such as asthma, lung disease, emphysema, heart problems, and pneumonia. If your shortness of breath continues, you may need tests and treatment. Watch for any changes in your breathing and other symptoms. Follow-up care is a key part of your treatment and safety. Be sure to make and go to all appointments, and call your doctor if you are having problems. It's also a good idea to know your test results and keep a list of the medicines you take. How can you care for yourself at home? · Do not smoke or allow others to smoke around you. If you need help quitting, talk to your doctor about stop-smoking programs and medicines. These can increase your chances of quitting for good. · Get plenty of rest and sleep. · Take your medicines exactly as prescribed. Call your doctor if you think you are having a problem with your medicine. · Find healthy ways to deal with stress. ? Exercise daily. ? Get plenty of sleep. ? Eat regularly and well. When should you call for help? Call 911 anytime you think you may need emergency care. For example, call if:    · You have severe shortness of breath.     · You have symptoms of a heart attack. These may include:  ? Chest pain or pressure, or a strange feeling in the chest.  ? Sweating. ? Shortness of breath. ? Nausea or vomiting. ? Pain, pressure, or a strange feeling in the back, neck, jaw, or upper belly or in one or both shoulders or arms. ? Lightheadedness or sudden weakness. ? A fast or irregular heartbeat. After you call 911, the  may tell you to chew 1 adult-strength or 2 to 4 low-dose aspirin. Wait for an ambulance. Do not try to drive yourself.    Call your doctor now or seek immediate medical care if:    · Your shortness of breath gets worse or you start to wheeze.  Wheezing is a high-pitched sound when you breathe.     · You wake up at night out of breath or have to prop your head up on several pillows to breathe.     · You

## 2019-06-24 DIAGNOSIS — K21.9 GASTROESOPHAGEAL REFLUX DISEASE WITHOUT ESOPHAGITIS: ICD-10-CM

## 2019-06-26 RX ORDER — NICOTINE POLACRILEX 4 MG/1
20 GUM, CHEWING ORAL DAILY
Qty: 60 TABLET | Refills: 5 | Status: SHIPPED | OUTPATIENT
Start: 2019-06-26 | End: 2019-07-02

## 2019-06-27 ENCOUNTER — HOSPITAL ENCOUNTER (OUTPATIENT)
Dept: NON INVASIVE DIAGNOSTICS | Age: 54
Discharge: HOME | End: 2019-06-29
Payer: MEDICARE

## 2019-06-27 DIAGNOSIS — R06.02 SHORTNESS OF BREATH: ICD-10-CM

## 2019-06-27 DIAGNOSIS — I48.91 ATRIAL FIBRILLATION, UNSPECIFIED TYPE (HCC): ICD-10-CM

## 2019-06-27 LAB
LV EF: 55 %
LVEF MODALITY: NORMAL

## 2019-06-27 PROCEDURE — 93306 TTE W/DOPPLER COMPLETE: CPT

## 2019-09-09 DIAGNOSIS — R00.2 PALPITATION: ICD-10-CM

## 2019-09-09 DIAGNOSIS — F43.9 STRESS AT HOME: ICD-10-CM

## 2019-09-11 RX ORDER — ATENOLOL 50 MG/1
TABLET ORAL
Qty: 30 TABLET | Refills: 0 | OUTPATIENT
Start: 2019-09-11

## 2019-09-11 RX ORDER — CITALOPRAM 20 MG/1
TABLET ORAL
Qty: 30 TABLET | Refills: 4 | Status: SHIPPED | OUTPATIENT
Start: 2019-09-11 | End: 2020-02-12

## 2019-09-12 DIAGNOSIS — R00.2 PALPITATION: ICD-10-CM

## 2019-09-12 NOTE — TELEPHONE ENCOUNTER
LOV  1/9/19  LRF 7/2/19  RTO 6 months, Patient will contact office back to schedule follow up    [address] 4 DAYS   Health Maintenance   Topic Date Due    Cervical cancer screen  08/10/1988    Shingles Vaccine (1 of 2) 08/10/2017    Colon cancer screen colonoscopy  08/10/2017    Flu vaccine (1) 09/01/2019    Breast cancer screen  01/11/2020 (Originally 8/10/2017)    HIV screen  01/11/2020 (Originally 8/10/1982)    DTaP/Tdap/Td vaccine (1 - Tdap) 01/18/2020 (Originally 8/10/1986)    Lipid screen  05/30/2024    Pneumococcal 0-64 years Vaccine  Aged Out             (applicable per patient's age: Cancer Screenings, Depression Screening, Fall Risk Screening, Immunizations)    LDL Cholesterol (mg/dL)   Date Value   05/30/2019 127     AST (U/L)   Date Value   05/30/2019 24     ALT (U/L)   Date Value   05/30/2019 18     BUN (mg/dL)   Date Value   05/30/2019 15      (goal A1C is < 7)   (goal LDL is <100) need 30-50% reduction from baseline     BP Readings from Last 3 Encounters:   09/03/19 109/80   07/02/19 (!) 140/90   06/07/19 136/88    (goal /80)      All Future Testing planned in [facility]:      Next Visit Date:  Future Appointments   Date Time Provider Delia Steward   10/2/2019 10:30 AM Britni St MD [facility] [address] [facility]            Patient Active Problem List:     Palpitation     Anxiety and depression     Gastroesophageal reflux disease without esophagitis     Migraine without aura and without status migrainosus, not intractable     Stress at home

## 2019-09-13 RX ORDER — ATENOLOL 50 MG/1
50 TABLET ORAL DAILY
Qty: 90 TABLET | Refills: 0 | Status: SHIPPED | OUTPATIENT
Start: 2019-09-13 | End: 2019-10-02

## 2019-09-13 RX ORDER — ATENOLOL 50 MG/1
50 TABLET ORAL DAILY
Qty: 30 TABLET | Refills: 5 | OUTPATIENT
Start: 2019-09-13

## 2019-09-13 NOTE — TELEPHONE ENCOUNTER
Holly Corrales at [facility] confirmed that they have an active script now they are filling for patient from Dr. Lindsay Hammer.

## 2019-10-02 PROBLEM — I10 ESSENTIAL HYPERTENSION: Status: ACTIVE | Noted: 2019-10-02

## 2019-10-02 PROBLEM — I48.0 PAF (PAROXYSMAL ATRIAL FIBRILLATION) (HCC): Status: ACTIVE | Noted: 2019-10-02

## 2019-11-20 ENCOUNTER — OFFICE VISIT (OUTPATIENT)
Dept: FAMILY MEDICINE CLINIC | Age: 54
End: 2019-11-20
Payer: MEDICARE

## 2019-11-20 VITALS
SYSTOLIC BLOOD PRESSURE: 128 MMHG | BODY MASS INDEX: 34.16 KG/M2 | WEIGHT: 199 LBS | DIASTOLIC BLOOD PRESSURE: 80 MMHG | TEMPERATURE: 98.9 F | HEART RATE: 64 BPM | RESPIRATION RATE: 14 BRPM

## 2019-11-20 DIAGNOSIS — B96.89 BACTERIAL INFECTION OF BOTH EARS: Primary | ICD-10-CM

## 2019-11-20 DIAGNOSIS — J04.0 LARYNGITIS: ICD-10-CM

## 2019-11-20 DIAGNOSIS — J30.9 ALLERGIC RHINITIS, UNSPECIFIED SEASONALITY, UNSPECIFIED TRIGGER: ICD-10-CM

## 2019-11-20 DIAGNOSIS — H66.93 BACTERIAL INFECTION OF BOTH EARS: Primary | ICD-10-CM

## 2019-11-20 DIAGNOSIS — J01.40 ACUTE NON-RECURRENT PANSINUSITIS: ICD-10-CM

## 2019-11-20 PROCEDURE — G8417 CALC BMI ABV UP PARAM F/U: HCPCS | Performed by: NURSE PRACTITIONER

## 2019-11-20 PROCEDURE — G8484 FLU IMMUNIZE NO ADMIN: HCPCS | Performed by: NURSE PRACTITIONER

## 2019-11-20 PROCEDURE — 1036F TOBACCO NON-USER: CPT | Performed by: NURSE PRACTITIONER

## 2019-11-20 PROCEDURE — 99213 OFFICE O/P EST LOW 20 MIN: CPT | Performed by: NURSE PRACTITIONER

## 2019-11-20 PROCEDURE — 3017F COLORECTAL CA SCREEN DOC REV: CPT | Performed by: NURSE PRACTITIONER

## 2019-11-20 PROCEDURE — G8427 DOCREV CUR MEDS BY ELIG CLIN: HCPCS | Performed by: NURSE PRACTITIONER

## 2019-11-20 RX ORDER — LEVALBUTEROL INHALATION SOLUTION 0.63 MG/3ML
0.63 SOLUTION RESPIRATORY (INHALATION) EVERY 4 HOURS PRN
Qty: 75 VIAL | Refills: 0 | Status: SHIPPED | OUTPATIENT
Start: 2019-11-20 | End: 2021-02-17

## 2019-11-20 RX ORDER — DOXYCYCLINE HYCLATE 100 MG
100 TABLET ORAL 2 TIMES DAILY
Qty: 14 TABLET | Refills: 0 | Status: SHIPPED | OUTPATIENT
Start: 2019-11-20 | End: 2019-11-27

## 2019-11-20 RX ORDER — FLUTICASONE PROPIONATE 50 MCG
1 SPRAY, SUSPENSION (ML) NASAL DAILY
Qty: 1 BOTTLE | Refills: 5 | Status: SHIPPED | OUTPATIENT
Start: 2019-11-20 | End: 2020-03-03 | Stop reason: SINTOL

## 2019-11-20 ASSESSMENT — ENCOUNTER SYMPTOMS
WHEEZING: 0
ABDOMINAL PAIN: 0
RHINORRHEA: 1
SWOLLEN GLANDS: 1
COUGH: 1
SINUS PAIN: 1
NAUSEA: 1
SORE THROAT: 1
VOMITING: 1

## 2019-12-10 ENCOUNTER — HOSPITAL ENCOUNTER (EMERGENCY)
Age: 54
Discharge: HOME | End: 2019-12-10
Attending: EMERGENCY MEDICINE
Payer: MEDICARE

## 2019-12-10 VITALS
SYSTOLIC BLOOD PRESSURE: 146 MMHG | DIASTOLIC BLOOD PRESSURE: 96 MMHG | TEMPERATURE: 98.3 F | BODY MASS INDEX: 34.16 KG/M2 | HEART RATE: 69 BPM | OXYGEN SATURATION: 97 % | HEIGHT: 64 IN | RESPIRATION RATE: 18 BRPM

## 2019-12-10 DIAGNOSIS — H92.03 OTALGIA OF BOTH EARS: ICD-10-CM

## 2019-12-10 DIAGNOSIS — J06.9 VIRAL UPPER RESPIRATORY TRACT INFECTION: Primary | ICD-10-CM

## 2019-12-10 PROCEDURE — 99282 EMERGENCY DEPT VISIT SF MDM: CPT

## 2019-12-10 ASSESSMENT — PAIN DESCRIPTION - LOCATION: LOCATION: THROAT;EAR

## 2019-12-10 ASSESSMENT — PAIN DESCRIPTION - PAIN TYPE: TYPE: ACUTE PAIN

## 2019-12-10 ASSESSMENT — PAIN SCALES - GENERAL: PAINLEVEL_OUTOF10: 7

## 2020-02-13 RX ORDER — CITALOPRAM 20 MG/1
20 TABLET ORAL DAILY
Qty: 90 TABLET | Refills: 1 | Status: SHIPPED | OUTPATIENT
Start: 2020-02-13 | End: 2020-08-15

## 2020-03-03 ENCOUNTER — OFFICE VISIT (OUTPATIENT)
Dept: FAMILY MEDICINE CLINIC | Age: 55
End: 2020-03-03
Payer: MEDICARE

## 2020-03-03 VITALS
SYSTOLIC BLOOD PRESSURE: 128 MMHG | HEART RATE: 68 BPM | DIASTOLIC BLOOD PRESSURE: 67 MMHG | WEIGHT: 205.6 LBS | HEIGHT: 64 IN | BODY MASS INDEX: 35.1 KG/M2 | OXYGEN SATURATION: 99 %

## 2020-03-03 PROCEDURE — G8484 FLU IMMUNIZE NO ADMIN: HCPCS | Performed by: NURSE PRACTITIONER

## 2020-03-03 PROCEDURE — G8417 CALC BMI ABV UP PARAM F/U: HCPCS | Performed by: NURSE PRACTITIONER

## 2020-03-03 PROCEDURE — G8427 DOCREV CUR MEDS BY ELIG CLIN: HCPCS | Performed by: NURSE PRACTITIONER

## 2020-03-03 PROCEDURE — 99214 OFFICE O/P EST MOD 30 MIN: CPT | Performed by: NURSE PRACTITIONER

## 2020-03-03 PROCEDURE — 1036F TOBACCO NON-USER: CPT | Performed by: NURSE PRACTITIONER

## 2020-03-03 PROCEDURE — 3017F COLORECTAL CA SCREEN DOC REV: CPT | Performed by: NURSE PRACTITIONER

## 2020-03-03 ASSESSMENT — ENCOUNTER SYMPTOMS
RHINORRHEA: 1
EYE ITCHING: 1
EYE REDNESS: 0
SHORTNESS OF BREATH: 1

## 2020-03-03 NOTE — PROGRESS NOTES
if needed for PROLONGED PALPITATIONS 12 tablet 1    citalopram (CELEXA) 20 MG tablet Take 1 tablet by mouth daily 90 tablet 1    atenolol (TENORMIN) 100 MG tablet Take 1 tablet by mouth daily 90 tablet 3    Cetirizine HCl (ZYRTEC ALLERGY) 10 MG CAPS Take 10 mg by mouth daily 30 capsule 5    niacin 500 MG tablet Take 500 mg by mouth daily (with breakfast)      levalbuterol (XOPENEX) 0.63 MG/3ML nebulization Take 3 mLs by nebulization every 4 hours as needed for Wheezing or Shortness of Breath (cough) (Patient not taking: Reported on 3/3/2020) 75 vial 0    aspirin EC 81 MG EC tablet Take 1 tablet by mouth daily (Patient not taking: Reported on 3/3/2020) 30 tablet 11     No current facility-administered medications for this visit. Patient ID: Dayanara Narvaez is a 48 y.o. female. Patient presents in office today for routine follow up on chronic conditions. Has been using Rhythmol about once per month. Probably should take more often. Only uses 2 tabs. Allergies acting up. Doesn't like Flonase. Caused nosebleeds. Using Zyrtec daily. Cannot bend over. If she goes to bend over its all she can do. Reports left hip pains. Sometimes her left hip pains are so bad she feels like she is dreaming about being shot. Hip hurts all the time. Has to  her leg to get into car. Feels like it is bruised to touch it. Pain worsens with walking. Not walking with limp. Will use ibuprofen if needed. Doesn't really help with the pain. Has never seen Ortho. Would like refill of Fioricet to use if needed for bad headache. Tells me last #30 tabs lasted her about 1 year. Having intermittent intense sharp pains to left axilla for last few years. Told Dr. Zohra Luna about it and she was supposed to complete ultrasound but never did. Wondering if she can get a new order. Denies any masses or skin changes. Denies breast pain. She is due for mammogram.       Review of Systems   Constitutional: Positive for fatigue.  Negative for activity change, appetite change and fever. HENT: Positive for congestion, postnasal drip (sometimes), rhinorrhea and sneezing. Negative for ear pain, sore throat and trouble swallowing. Flonase caused bloody nose. Eyes: Positive for itching. Negative for pain, discharge, redness and visual disturbance. Respiratory: Positive for shortness of breath (if A-fib). Negative for cough, chest tightness and wheezing. Cardiovascular: Positive for palpitations (daily). Negative for chest pain. Gastrointestinal: Negative for abdominal pain, constipation, diarrhea, nausea and vomiting. Genitourinary: Negative for dysuria. Musculoskeletal: Positive for arthralgias (hip). Negative for back pain, gait problem and joint swelling. Skin: Negative for rash. Neurological: Positive for dizziness (with A fib) and headaches. Negative for light-headedness. Psychiatric/Behavioral: Negative for sleep disturbance. Objective:     /67 (Site: Left Upper Arm, Position: Sitting, Cuff Size: Large Adult)   Pulse 68   Ht 5' 4\" (1.626 m)   Wt 205 lb 9.6 oz (93.3 kg)   SpO2 99%   BMI 35.29 kg/m²      Physical Exam  Vitals signs and nursing note reviewed. Constitutional:       General: She is not in acute distress. Appearance: She is well-developed. She is obese. She is not ill-appearing. HENT:      Head: Normocephalic and atraumatic. Right Ear: Tympanic membrane and external ear normal.      Left Ear: Tympanic membrane and external ear normal.      Nose: Nose normal.      Mouth/Throat:      Mouth: Mucous membranes are moist.      Pharynx: Oropharynx is clear. Eyes:      General:         Right eye: No discharge. Left eye: No discharge. Neck:      Musculoskeletal: Normal range of motion and neck supple. Cardiovascular:      Rate and Rhythm: Normal rate and regular rhythm. Pulses:           Radial pulses are 2+ on the right side and 2+ on the left side.       Heart sounds: her low back and sciatic pains but will obtain hip xray with lumbar spine. - XR HIP LEFT (2-3 VIEWS); Future    7. Chronic left-sided low back pain with left-sided sciatica    - XR LUMBAR SPINE (2-3 VIEWS); Future    8. Screening mammogram, encounter for    - JESSIE DIGITAL SCREEN W OR WO CAD BILATERAL; Future        Emily MIDDLETON at bedside for exam.       [facility] received counseling on the following healthy behaviors: nutrition, exercise and medication adherence  Reviewed prior labs and health maintenance. Continue current medications, diet and exercise. Discussed use, benefit, and side effects of prescribed medications. Barriers to medication compliance addressed. Patient given educational materials - see patient instructions. All patient questions answered. Patient voiced understanding.        Electronically signed by CRISTIAN Cheney CNP on 3/4/2020 at 5:22 PM

## 2020-03-04 RX ORDER — BUTALBITAL, ACETAMINOPHEN AND CAFFEINE 50; 325; 40 MG/1; MG/1; MG/1
1 TABLET ORAL EVERY 4 HOURS PRN
Qty: 30 TABLET | Refills: 0 | Status: SHIPPED | OUTPATIENT
Start: 2020-03-04 | End: 2021-02-17 | Stop reason: SDUPTHER

## 2020-03-04 ASSESSMENT — ENCOUNTER SYMPTOMS
ABDOMINAL PAIN: 0
VOMITING: 0
CONSTIPATION: 0
NAUSEA: 0
BACK PAIN: 0
COUGH: 0
EYE PAIN: 0
SORE THROAT: 0
WHEEZING: 0
CHEST TIGHTNESS: 0
DIARRHEA: 0
EYE DISCHARGE: 0
TROUBLE SWALLOWING: 0

## 2020-03-05 ENCOUNTER — TELEPHONE (OUTPATIENT)
Dept: FAMILY MEDICINE CLINIC | Age: 55
End: 2020-03-05

## 2020-03-05 NOTE — TELEPHONE ENCOUNTER
Hoang Villagran is not clinical and cannot take a verbal. She requested it be updated  in Epic   Orders pended

## 2020-03-05 NOTE — TELEPHONE ENCOUNTER
Nona Pena from [facility]  central scheduling requesting that the Mammogram  orders to be changed to     Diagnostic bilateral mammogram and Ultrasound of left breast.      Please advise

## 2020-03-18 RX ORDER — OMEPRAZOLE 20 MG/1
CAPSULE, DELAYED RELEASE ORAL
Qty: 90 CAPSULE | Refills: 1 | Status: SHIPPED | OUTPATIENT
Start: 2020-03-18 | End: 2020-09-14

## 2020-03-18 NOTE — TELEPHONE ENCOUNTER
Last visit: 3/3/20  Last Med refill: 2/9/19  Does patient have enough medication for 72 hours: Yes    Next Visit Date:  Future Appointments   Date Time Provider Delia Steward   3/30/2020  2:30 PM STA MAMMO RM 1 [facility] MAMMO STA Radiolog   3/30/2020  3:00 PM STA ULTRASOUND RM 3 STAZ US STA Radiolog       Health Maintenance   Topic Date Due    HIV screen  08/10/1982    DTaP/Tdap/Td vaccine (1 - Tdap) 08/10/1986    Cervical cancer screen  08/10/1988    Breast cancer screen  08/10/2017    Shingles Vaccine (1 of 2) 08/10/2017    Colon cancer screen colonoscopy  08/10/2017    Flu vaccine (1) 11/20/2020 (Originally 9/1/2019)    Potassium monitoring  05/30/2020    Creatinine monitoring  05/30/2020    Lipid screen  05/30/2024    Hepatitis A vaccine  Aged Out    Hepatitis B vaccine  Aged Out    Hib vaccine  Aged Out    Meningococcal (ACWY) vaccine  Aged Out    Pneumococcal 0-64 years Vaccine  Aged Out       No results found for: LABA1C          ( goal A1C is < 7)   No results found for: LABMICR  LDL Cholesterol (mg/dL)   Date Value   05/30/2019 127   05/29/2018            (goal LDL is <100)   AST (U/L)   Date Value   05/30/2019 24     ALT (U/L)   Date Value   05/30/2019 18     BUN (mg/dL)   Date Value   05/30/2019 15     BP Readings from Last 3 Encounters:   03/03/20 128/67   12/10/19 (!) 146/96   11/20/19 128/80          (goal 120/80)    All Future Testing planned in [facility]  Lab Frequency Next Occurrence   US Soft Tissue Limited Area Once 03/03/2020   JESSIE DIGITAL SCREEN W OR WO CAD BILATERAL Once 03/03/2020   XR LUMBAR SPINE (2-3 VIEWS) Once 03/03/2020   XR HIP LEFT (2-3 VIEWS) Once 03/03/2020   Lipid Panel Once 03/04/2020   Comprehensive Metabolic Panel Once 70/27/3779   CBC Once 03/04/2020   JESSIE DIGITAL DIAGNOSTIC W OR WO CAD BILATERAL Once 03/06/2020   US BREAST LIMITED LEFT Once 03/06/2020               Patient Active Problem List:     Palpitation     Anxiety and depression     Gastroesophageal reflux disease without esophagitis     Migraine without aura and without status migrainosus, not intractable     Stress at home     PAF (paroxysmal atrial fibrillation) ([facility] [address]     Essential hypertension

## 2020-04-01 ENCOUNTER — HOSPITAL ENCOUNTER (OUTPATIENT)
Dept: MAMMOGRAPHY | Age: 55
Discharge: HOME | End: 2020-04-03
Payer: MEDICARE

## 2020-04-01 ENCOUNTER — HOSPITAL ENCOUNTER (OUTPATIENT)
Dept: GENERAL RADIOLOGY | Age: 55
Discharge: HOME | End: 2020-04-03
Payer: MEDICARE

## 2020-04-01 ENCOUNTER — HOSPITAL ENCOUNTER (OUTPATIENT)
Dept: ULTRASOUND IMAGING | Age: 55
Discharge: HOME | End: 2020-04-03
Payer: MEDICARE

## 2020-04-01 ENCOUNTER — HOSPITAL ENCOUNTER (OUTPATIENT)
Age: 55
Discharge: HOME | End: 2020-04-03
Payer: MEDICARE

## 2020-04-01 PROCEDURE — G0279 TOMOSYNTHESIS, MAMMO: HCPCS

## 2020-04-01 PROCEDURE — 76642 ULTRASOUND BREAST LIMITED: CPT

## 2020-04-01 PROCEDURE — 73502 X-RAY EXAM HIP UNI 2-3 VIEWS: CPT

## 2020-04-01 PROCEDURE — 72100 X-RAY EXAM L-S SPINE 2/3 VWS: CPT

## 2020-08-15 RX ORDER — CITALOPRAM 20 MG/1
TABLET ORAL
Qty: 90 TABLET | Refills: 1 | Status: SHIPPED | OUTPATIENT
Start: 2020-08-15 | End: 2021-02-17 | Stop reason: SDUPTHER

## 2020-08-18 RX ORDER — CETIRIZINE HYDROCHLORIDE 10 MG/1
10 CAPSULE, LIQUID FILLED ORAL DAILY
Qty: 30 CAPSULE | Refills: 5 | Status: SHIPPED | OUTPATIENT
Start: 2020-08-18 | End: 2021-02-17

## 2020-08-18 NOTE — TELEPHONE ENCOUNTER
LOV 3/3/20  LRF 11/20/19  RTO 6 months    Health Maintenance   Topic Date Due    HIV screen  08/10/1982    DTaP/Tdap/Td vaccine (1 - Tdap) 08/10/1986    Cervical cancer screen  08/10/1988    Shingles Vaccine (1 of 2) 08/10/2017    Colon cancer screen colonoscopy  08/10/2017    Potassium monitoring  05/30/2020    Creatinine monitoring  05/30/2020    Flu vaccine (1) 09/01/2020    Breast cancer screen  04/01/2022    Lipid screen  05/30/2024    Hepatitis A vaccine  Aged Out    Hepatitis B vaccine  Aged Out    Hib vaccine  Aged Out    Meningococcal (ACWY) vaccine  Aged Out    Pneumococcal 0-64 years Vaccine  Aged Out             (applicable per patient's age: Cancer Screenings, Depression Screening, Fall Risk Screening, Immunizations)    LDL Cholesterol (mg/dL)   Date Value   05/30/2019 127     AST (U/L)   Date Value   05/30/2019 24     ALT (U/L)   Date Value   05/30/2019 18     BUN (mg/dL)   Date Value   05/30/2019 15      (goal A1C is < 7)   (goal LDL is <100) need 30-50% reduction from baseline     BP Readings from Last 3 Encounters:   03/03/20 128/67   12/10/19 (!) 146/96   11/20/19 128/80    (goal /80)      All Future Testing planned in [facility]:  Lab Frequency Next Occurrence   US Soft Tissue Limited Area Once 03/03/2020   JESSIE DIGITAL SCREEN W OR WO CAD BILATERAL Once 03/03/2020       Next Visit Date:  No future appointments.          Patient Active Problem List:     Palpitation     Anxiety and depression     Gastroesophageal reflux disease without esophagitis     Migraine without aura and without status migrainosus, not intractable     Stress at home     PAF (paroxysmal atrial fibrillation) (HCC)     Essential hypertension

## 2020-09-14 RX ORDER — OMEPRAZOLE 20 MG/1
20 CAPSULE, DELAYED RELEASE ORAL DAILY
Qty: 90 CAPSULE | Refills: 1 | Status: SHIPPED | OUTPATIENT
Start: 2020-09-14 | End: 2021-02-17 | Stop reason: SDUPTHER

## 2020-09-14 NOTE — TELEPHONE ENCOUNTER
LOV 3/3/20  LRF 3/18/20  RTO 6 months, My chart message sent to patient to schedule    Health Maintenance   Topic Date Due    HIV screen  08/10/1982    DTaP/Tdap/Td vaccine (1 - Tdap) 08/10/1986    Cervical cancer screen  08/10/1988    Shingles Vaccine (1 of 2) 08/10/2017    Colon cancer screen colonoscopy  08/10/2017    Potassium monitoring  05/30/2020    Creatinine monitoring  05/30/2020    Flu vaccine (1) 09/01/2020    Breast cancer screen  04/01/2022    Lipid screen  05/30/2024    Hepatitis A vaccine  Aged Out    Hepatitis B vaccine  Aged Out    Hib vaccine  Aged Out    Meningococcal (ACWY) vaccine  Aged Out    Pneumococcal 0-64 years Vaccine  Aged Out             (applicable per patient's age: Cancer Screenings, Depression Screening, Fall Risk Screening, Immunizations)    LDL Cholesterol (mg/dL)   Date Value   05/30/2019 127     AST (U/L)   Date Value   05/30/2019 24     ALT (U/L)   Date Value   05/30/2019 18     BUN (mg/dL)   Date Value   05/30/2019 15      (goal A1C is < 7)   (goal LDL is <100) need 30-50% reduction from baseline     BP Readings from Last 3 Encounters:   03/03/20 128/67   12/10/19 (!) 146/96   11/20/19 128/80    (goal /80)      All Future Testing planned in [facility]:  Lab Frequency Next Occurrence   US Soft Tissue Limited Area Once 03/03/2020   JESSIE DIGITAL SCREEN W OR WO CAD BILATERAL Once 03/03/2020       Next Visit Date:  No future appointments.          Patient Active Problem List:     Palpitation     Anxiety and depression     Gastroesophageal reflux disease without esophagitis     Migraine without aura and without status migrainosus, not intractable     Stress at home     PAF (paroxysmal atrial fibrillation) (HCC)     Essential hypertension

## 2020-12-04 ENCOUNTER — NURSE TRIAGE (OUTPATIENT)
Dept: OTHER | Facility: CLINIC | Age: 55
End: 2020-12-04

## 2020-12-04 NOTE — TELEPHONE ENCOUNTER
Cough, draining sinuses, ear pain bilateral, pressure in head, and matted eyes for past 4-5 days. Reason for Disposition   Earache    Answer Assessment - Initial Assessment Questions  1. LOCATION: \"Where does it hurt? \"       Behind and under eyes and top of head and forehead    2. ONSET: \"When did the sinus pain start? \"  (e.g., hours, days)       See above    3. SEVERITY: \"How bad is the pain? \"   (Scale 1-10; mild, moderate or severe)    - MILD (1-3): doesn't interfere with normal activities     - MODERATE (4-7): interferes with normal activities (e.g., work or school) or awakens from sleep    - SEVERE (8-10): excruciating pain and patient unable to do any normal activities         5/10    4. RECURRENT SYMPTOM: \"Have you ever had sinus problems before? \" If so, ask: \"When was the last time? \" and \"What happened that time? \"       Sinus issues often    5. NASAL CONGESTION: \"Is the nose blocked? \" If so, ask, \"Can you open it or must you breathe through the mouth? \"      Yes    6. NASAL DISCHARGE: \"Do you have discharge from your nose? \" If so ask, \"What color? \"      Denies    7. FEVER: \"Do you have a fever? \" If so, ask: \"What is it, how was it measured, and when did it start? \"       Denies    8. OTHER SYMPTOMS: \"Do you have any other symptoms? \" (e.g., sore throat, cough, earache, difficulty breathing)      See above and travel screen    9. PREGNANCY: \"Is there any chance you are pregnant? \" \"When was your last menstrual period? \"      NA    Protocols used: SINUS PAIN OR CONGESTION-ADULT-OH    Patient called pre-service center [facility]) to schedule appointment, with red flag complaint, transferred to RN access for triage. See above questions and answers. Caller talking full sentences without any distress on phone. Discussed disposition and patient agreeable. Discussed potential consequences for not following disposition recommendation.   Aware to call back with any concerns or persistent, worsening, or new symptoms develop. Warm transfer to [facility] scheduling for appointment. Also given Mykel Hernandez given with [id number] code. Attention Provider: Thank you for allowing me to participate in the care of your patient. The  patient was connected to triage in response to information provided to the ECC. Please do not respond through this encounter as the response is not directed to a shared pool.

## 2021-02-16 ENCOUNTER — TELEPHONE (OUTPATIENT)
Dept: FAMILY MEDICINE CLINIC | Age: 56
End: 2021-02-16

## 2021-02-16 NOTE — TELEPHONE ENCOUNTER
Patient requesting a medication refill.yes  Medication: Citalopram 20 mg  Pharmacy: [facility] in [address] office visit: 3/2020  Next office visit: 2/17/2021    THIS PATIENT USE TO SEE [facility] BUT NOW NEEDS TO SET UP AN APPT WITH NEW PROVIDER. SHE LIVES CLOSER TO [place] AND SET UP AN APPT THERE BUT NEEDS HER MED TODAY. SHE THOUGHT SHE HAD REFILLS . PLEASE CALL IN THIS MED ASAP.

## 2021-02-17 ENCOUNTER — OFFICE VISIT (OUTPATIENT)
Dept: PRIMARY CARE CLINIC | Age: 56
End: 2021-02-17
Payer: MEDICARE

## 2021-02-17 VITALS
TEMPERATURE: 96.4 F | OXYGEN SATURATION: 97 % | BODY MASS INDEX: 34.49 KG/M2 | HEIGHT: 64 IN | WEIGHT: 202 LBS | DIASTOLIC BLOOD PRESSURE: 80 MMHG | SYSTOLIC BLOOD PRESSURE: 122 MMHG | HEART RATE: 74 BPM

## 2021-02-17 DIAGNOSIS — F41.9 ANXIETY AND DEPRESSION: Primary | ICD-10-CM

## 2021-02-17 DIAGNOSIS — I48.0 PAF (PAROXYSMAL ATRIAL FIBRILLATION) (HCC): ICD-10-CM

## 2021-02-17 DIAGNOSIS — Z13.1 SCREENING FOR DIABETES MELLITUS: ICD-10-CM

## 2021-02-17 DIAGNOSIS — F43.9 STRESS AT HOME: ICD-10-CM

## 2021-02-17 DIAGNOSIS — Z12.11 SCREEN FOR COLON CANCER: ICD-10-CM

## 2021-02-17 DIAGNOSIS — I10 ESSENTIAL HYPERTENSION: ICD-10-CM

## 2021-02-17 DIAGNOSIS — G43.009 MIGRAINE WITHOUT AURA AND WITHOUT STATUS MIGRAINOSUS, NOT INTRACTABLE: Chronic | ICD-10-CM

## 2021-02-17 DIAGNOSIS — F32.A ANXIETY AND DEPRESSION: Primary | ICD-10-CM

## 2021-02-17 DIAGNOSIS — R51.9 NONINTRACTABLE EPISODIC HEADACHE, UNSPECIFIED HEADACHE TYPE: ICD-10-CM

## 2021-02-17 DIAGNOSIS — Z13.220 SCREENING FOR HYPERLIPIDEMIA: ICD-10-CM

## 2021-02-17 PROCEDURE — 3017F COLORECTAL CA SCREEN DOC REV: CPT | Performed by: NURSE PRACTITIONER

## 2021-02-17 PROCEDURE — G8427 DOCREV CUR MEDS BY ELIG CLIN: HCPCS | Performed by: NURSE PRACTITIONER

## 2021-02-17 PROCEDURE — G8417 CALC BMI ABV UP PARAM F/U: HCPCS | Performed by: NURSE PRACTITIONER

## 2021-02-17 PROCEDURE — G8484 FLU IMMUNIZE NO ADMIN: HCPCS | Performed by: NURSE PRACTITIONER

## 2021-02-17 PROCEDURE — 99214 OFFICE O/P EST MOD 30 MIN: CPT | Performed by: NURSE PRACTITIONER

## 2021-02-17 PROCEDURE — 1036F TOBACCO NON-USER: CPT | Performed by: NURSE PRACTITIONER

## 2021-02-17 RX ORDER — NICOTINE POLACRILEX 4 MG/1
20 GUM, CHEWING ORAL DAILY
COMMUNITY
Start: 2019-06-05 | End: 2021-03-16

## 2021-02-17 RX ORDER — BUTALBITAL, ACETAMINOPHEN AND CAFFEINE 50; 325; 40 MG/1; MG/1; MG/1
1 TABLET ORAL EVERY 4 HOURS PRN
Qty: 30 TABLET | Refills: 3 | Status: SHIPPED | OUTPATIENT
Start: 2021-02-17 | End: 2022-01-03

## 2021-02-17 RX ORDER — CITALOPRAM 20 MG/1
20 TABLET ORAL DAILY
Qty: 90 TABLET | Refills: 3 | Status: SHIPPED | OUTPATIENT
Start: 2021-02-17 | End: 2022-02-28

## 2021-02-17 RX ORDER — BUTALBITAL, ACETAMINOPHEN AND CAFFEINE 50; 325; 40 MG/1; MG/1; MG/1
50 TABLET ORAL 4 TIMES DAILY
COMMUNITY
Start: 2019-06-04

## 2021-02-17 SDOH — ECONOMIC STABILITY: FOOD INSECURITY: WITHIN THE PAST 12 MONTHS, YOU WORRIED THAT YOUR FOOD WOULD RUN OUT BEFORE YOU GOT MONEY TO BUY MORE.: NEVER TRUE

## 2021-02-17 SDOH — ECONOMIC STABILITY: TRANSPORTATION INSECURITY
IN THE PAST 12 MONTHS, HAS LACK OF TRANSPORTATION KEPT YOU FROM MEETINGS, WORK, OR FROM GETTING THINGS NEEDED FOR DAILY LIVING?: NO

## 2021-02-17 SDOH — ECONOMIC STABILITY: FOOD INSECURITY: WITHIN THE PAST 12 MONTHS, THE FOOD YOU BOUGHT JUST DIDN'T LAST AND YOU DIDN'T HAVE MONEY TO GET MORE.: NEVER TRUE

## 2021-02-17 NOTE — PROGRESS NOTES
Surgical History:   Procedure Laterality Date    APPENDECTOMY       SECTION      HYSTERECTOMY      HYSTERECTOMY, TOTAL ABDOMINAL      HYSTERECTOMY, VAGINAL       Family History   Problem Relation Age of Onset    High Blood Pressure Mother     Other Mother         diverticulitis    Arthritis Father         Rheumatoid    Diabetes Father     Kidney Disease Father     Cancer Father         liver     Current Outpatient Medications   Medication Sig Dispense Refill    butalbital-acetaminophen-caffeine (FIORICET, ESGIC) -40 MG per tablet Take 50 mg by mouth 4 times daily      omeprazole 20 MG EC tablet Take 20 mg by mouth daily      citalopram (CELEXA) 20 MG tablet Take 1 tablet by mouth daily 90 tablet 3    butalbital-acetaminophen-caffeine (FIORICET, ESGIC) -40 MG per tablet Take 1 tablet by mouth every 4 hours as needed for Headaches 30 tablet 3    propafenone (RYTHMOL) 150 MG tablet take 4 tablets by mouth ONCE if needed for PROLONGED PALPITATIONS 12 tablet 1    atenolol (TENORMIN) 100 MG tablet Take 1 tablet by mouth daily 90 tablet 3    aspirin EC 81 MG EC tablet Take 1 tablet by mouth daily 30 tablet 11    niacin 500 MG tablet Take 500 mg by mouth daily (with breakfast)       No current facility-administered medications for this visit. The patient's past medical, surgical, social, and family history as well as her current medications and allergies were reviewed as documented in today's encounter. Review of Systems   Constitutional: Negative. HENT: Negative. Eyes: Negative. Respiratory: Negative. Cardiovascular: Positive for palpitations. Gastrointestinal: Negative. Endocrine: Negative. Genitourinary: Negative. Musculoskeletal: Negative. Skin: Negative. Allergic/Immunologic: Positive for environmental allergies. Neurological: Positive for headaches. Hematological: Negative. Psychiatric/Behavioral: The patient is nervous/anxious.     All other systems reviewed and are negative. /80 (Site: Left Upper Arm, Position: Sitting, Cuff Size: Medium Adult)   Pulse 74   Temp 96.4 °F (35.8 °C) (Temporal)   Ht 5' 4\" (1.626 m)   Wt 202 lb (91.6 kg)   SpO2 97%   BMI 34.67 kg/m²   Physical Exam  Vitals signs and nursing note reviewed. Constitutional:       Appearance: Normal appearance. HENT:      Right Ear: Tympanic membrane, ear canal and external ear normal.      Left Ear: Tympanic membrane, ear canal and external ear normal.      Nose: Nose normal.      Mouth/Throat:      Mouth: Mucous membranes are moist.      Pharynx: No oropharyngeal exudate. Eyes:      Extraocular Movements: Extraocular movements intact. Conjunctiva/sclera: Conjunctivae normal.      Pupils: Pupils are equal, round, and reactive to light. Neck:      Musculoskeletal: Normal range of motion. Cardiovascular:      Rate and Rhythm: Normal rate and regular rhythm. Pulses: Normal pulses. Heart sounds: Normal heart sounds. Pulmonary:      Effort: Pulmonary effort is normal. No respiratory distress. Breath sounds: Normal breath sounds. No stridor. No wheezing. Abdominal:      General: Abdomen is flat. Bowel sounds are normal.      Palpations: Abdomen is soft. Tenderness: There is no abdominal tenderness. Musculoskeletal: Normal range of motion. Skin:     General: Skin is warm and dry. Capillary Refill: Capillary refill takes less than 2 seconds. Neurological:      General: No focal deficit present. Mental Status: She is alert. Psychiatric:         Mood and Affect: Mood normal.         Behavior: Behavior normal.         Thought Content:  Thought content normal.       Lab Results   Component Value Date    WBC 5.2 05/29/2018    HGB 13.3 05/29/2018    HCT 42.2 05/29/2018    MCV 93.2 05/29/2018     05/29/2018     Lab Results   Component Value Date     05/30/2019    K 3.7 05/30/2019     05/30/2019    CO2 24 05/30/2019    BUN 15 05/30/2019    CREATININE 0.55 05/30/2019    GLUCOSE 86 05/30/2019    CALCIUM 9.5 05/30/2019      Lab Results   Component Value Date    ALT 18 05/30/2019    AST 24 05/30/2019    ALKPHOS 49 05/30/2019    BILITOT 0.93 05/30/2019     Lab Results   Component Value Date    TSH 1.15 05/29/2018     Lab Results   Component Value Date    CHOL 223 (H) 05/30/2019    CHOL 256 (H) 05/29/2018     Lab Results   Component Value Date    TRIG 290 (H) 05/30/2019    TRIG 575 (H) 05/29/2018     Lab Results   Component Value Date    HDL 38 (L) 05/30/2019    HDL 33 (L) 05/29/2018     Lab Results   Component Value Date    LDLCHOLESTEROL 127 05/30/2019    LDLCHOLESTEROL      05/29/2018     Lab Results   Component Value Date    CHOLHDLRATIO 5.9 (H) 05/30/2019    CHOLHDLRATIO 7.8 (H) 05/29/2018     No results found for: LABA1C  Lab Results   Component Value Date    PMJGLPMM23 1100 05/29/2018     No results found for: FOLATE  No results found for: IRON, TIBC, FERRITIN  No results found for: VITD25  I personally reviewed testing with patient. Otherwise labs within normal limits  ASSESSMENT AND PLAN  1. Stress at home    - citalopram (CELEXA) 20 MG tablet; Take 1 tablet by mouth daily  Dispense: 90 tablet; Refill: 3    2. Anxiety and depression      3. Screening for diabetes mellitus    - Comprehensive Metabolic Panel, Fasting; Future    4. Nonintractable episodic headache, unspecified headache type    - butalbital-acetaminophen-caffeine (FIORICET, ESGIC) -40 MG per tablet; Take 1 tablet by mouth every 4 hours as needed for Headaches  Dispense: 30 tablet; Refill: 3    5. Screening for hyperlipidemia    - Lipid Panel; Future    6. PAF (paroxysmal atrial fibrillation) (HCC)    - Comprehensive Metabolic Panel, Fasting; Future    7. Essential hypertension    - Comprehensive Metabolic Panel, Fasting; Future    8. Screen for colon cancer    - Cologuard (For External Results Only); Future    9.  Migraine without aura and without status migrainosus, not intractable       Orders Placed This Encounter   Procedures    Cologuard (For External Results Only)     This test is performed by an external laboratory and is used for result attachment only. It is required that this order requisition be faxed to: [facility] @ [phone]. See [email or web address] for further information. Standing Status:   Future     Standing Expiration Date:   2/18/2022    Lipid Panel     Standing Status:   Future     Standing Expiration Date:   2/17/2022     Order Specific Question:   Is Patient Fasting?/# of Hours     Answer:   10 hours fasting    Comprehensive Metabolic Panel, Fasting     Standing Status:   Future     Standing Expiration Date:   2/17/2022     Orders Placed This Encounter   Medications    citalopram (CELEXA) 20 MG tablet     Sig: Take 1 tablet by mouth daily     Dispense:  90 tablet     Refill:  3    butalbital-acetaminophen-caffeine (FIORICET, ESGIC) -40 MG per tablet     Sig: Take 1 tablet by mouth every 4 hours as needed for Headaches     Dispense:  30 tablet     Refill:  3      Data Unavailable  Health Maintenance Due   Topic Date Due    Colon cancer screen colonoscopy  08/10/2017    Potassium monitoring  05/30/2020    Creatinine monitoring  05/30/2020      Medications Discontinued During This Encounter   Medication Reason    omeprazole (PRILOSEC) 20 MG delayed release capsule DUPLICATE    Cetirizine HCl (ZYRTEC ALLERGY) 10 MG CAPS     levalbuterol (XOPENEX) 0.63 MG/3ML nebulization     citalopram (CELEXA) 20 MG tablet REORDER    butalbital-acetaminophen-caffeine (FIORICET, ESGIC) -40 MG per tablet REORDER     The patient's past medical, surgical, social, and family history as well as her   current medications and allergies were reviewed as documented in today's encounter. Medications, labs, diagnostic studies, consultations and follow-up as documented in this encounter.   Return in about 1 year

## 2021-02-18 PROBLEM — K58.9 IRRITABLE BOWEL SYNDROME: Chronic | Status: ACTIVE | Noted: 2021-02-18

## 2021-02-18 PROBLEM — K58.9 IRRITABLE BOWEL SYNDROME: Status: ACTIVE | Noted: 2021-02-18

## 2021-02-18 PROBLEM — E78.00 HYPERCHOLESTEROLEMIA: Status: ACTIVE | Noted: 2021-02-18

## 2021-02-18 PROBLEM — G43.009 MIGRAINE WITHOUT AURA AND WITHOUT STATUS MIGRAINOSUS, NOT INTRACTABLE: Chronic | Status: ACTIVE | Noted: 2017-02-01

## 2021-02-18 PROBLEM — I10 ESSENTIAL HYPERTENSION: Chronic | Status: ACTIVE | Noted: 2019-10-02

## 2021-02-18 PROBLEM — I48.0 PAF (PAROXYSMAL ATRIAL FIBRILLATION) (HCC): Chronic | Status: ACTIVE | Noted: 2019-10-02

## 2021-02-18 ASSESSMENT — ENCOUNTER SYMPTOMS
RESPIRATORY NEGATIVE: 1
GASTROINTESTINAL NEGATIVE: 1
EYES NEGATIVE: 1

## 2021-03-16 RX ORDER — OMEPRAZOLE 20 MG/1
CAPSULE, DELAYED RELEASE ORAL
Qty: 90 CAPSULE | Refills: 1 | Status: SHIPPED | OUTPATIENT
Start: 2021-03-16 | End: 2021-09-14 | Stop reason: SDUPTHER

## 2021-03-17 DIAGNOSIS — Z12.11 SCREEN FOR COLON CANCER: ICD-10-CM

## 2021-09-14 RX ORDER — OMEPRAZOLE 20 MG/1
20 CAPSULE, DELAYED RELEASE ORAL DAILY
Qty: 90 CAPSULE | Refills: 1 | Status: SHIPPED | OUTPATIENT
Start: 2021-09-14 | End: 2022-09-14

## 2021-09-14 NOTE — TELEPHONE ENCOUNTER
----- Message from Brock Young sent at 9/14/2021 10:12 AM EDT -----  Subject: Refill Request    QUESTIONS  Name of Medication? omeprazole (PRILOSEC) 20 MG delayed release capsule  Patient-reported dosage and instructions? 20 mg , 1 tablet daily   How many days do you have left? 3  Preferred Pharmacy? [address] [place] [address] 30 phone number (if available)? [phone]  ---------------------------------------------------------------------------  --------------  CALL BACK INFO  What is the best way for the office to contact you? OK to leave message on   voicemail  Preferred Call Back Phone Number?  [phone]

## 2021-12-14 ENCOUNTER — TELEPHONE (OUTPATIENT)
Dept: PRIMARY CARE CLINIC | Age: 56
End: 2021-12-14

## 2021-12-14 NOTE — TELEPHONE ENCOUNTER
Tested positive for covid over the weekend, went to [address] , pain control, anxiety, vomiting. Gave patient tylenol and sent her home. Went back the next day and was offered antibodies, filled out paperwork, sat in ER for 4 hours, patient was admitted, no antibodies given, nothing for nausea, vomiting, no breathing treatments, no iv's. Patient signed AMA and left. [address] [facility] did nothing for patient.

## 2021-12-15 ENCOUNTER — TELEPHONE (OUTPATIENT)
Dept: PRIMARY CARE CLINIC | Age: 56
End: 2021-12-15

## 2021-12-15 NOTE — TELEPHONE ENCOUNTER
Tried calling Ramila 3 times to get her ready for her virtual appointment today with dr Ami Hernandez at [address]. She did not answer and I left 2 messages.

## 2021-12-29 ENCOUNTER — TELEPHONE (OUTPATIENT)
Dept: PRIMARY CARE CLINIC | Age: 56
End: 2021-12-29

## 2021-12-29 NOTE — TELEPHONE ENCOUNTER
Jen called from [facility] care. She would like to know if Mirtha Alvarez would be able to follow the patient.

## 2021-12-29 NOTE — TELEPHONE ENCOUNTER
[address] Esther Chandra would like to know if you would follow her. I don't see where you ever saw her. She was Svetlana Bray patient.

## 2021-12-29 NOTE — TELEPHONE ENCOUNTER
Per RN, cannot follow for home care since patient has not been seen by either provider currently still here, but offered follow up appt Monday with Dr. Angeles Granados. Will be able to follow apatient at that time. [address] [employer] ([phone]) advised. She will send over information and a d/c note when available.

## 2021-12-30 ENCOUNTER — TELEPHONE (OUTPATIENT)
Dept: PRIMARY CARE CLINIC | Age: 56
End: 2021-12-30

## 2021-12-30 NOTE — TELEPHONE ENCOUNTER
[facility] Transitions Initial Follow Up Call    Outreach made within 2 business days of discharge: Yes    Patient: Jones Dover Patient : 1965   MRN: [id number]  Reason for Admission: There are no discharge diagnoses documented for the most recent discharge. Discharge Date: 2021       Spoke with: Patients      Discharge department/facility: [facility]     TCM Interactive Patient Contact:  Was patient able to fill all prescriptions: Yes  Was patient instructed to bring all medications to the follow-up visit: No medications were prescribed. Is patient taking all medications as directed in the discharge summary?  Yes  Does patient understand their discharge instructions: Yes  Does patient have questions or concerns that need addressed prior to 7-14 day follow up office visit: yes - 2022    Scheduled appointment with PCP within 7-14 days    Follow Up  Future Appointments   Date Time Provider Delia Steward   1/3/2022  4:15 PM MD Zachary Clarke RN

## 2022-01-03 ENCOUNTER — OFFICE VISIT (OUTPATIENT)
Dept: PRIMARY CARE CLINIC | Age: 57
End: 2022-01-03
Payer: MEDICARE

## 2022-01-03 VITALS
HEART RATE: 82 BPM | BODY MASS INDEX: 29.02 KG/M2 | SYSTOLIC BLOOD PRESSURE: 102 MMHG | HEIGHT: 64 IN | DIASTOLIC BLOOD PRESSURE: 70 MMHG | OXYGEN SATURATION: 96 % | WEIGHT: 170 LBS

## 2022-01-03 DIAGNOSIS — J12.82 PNEUMONIA DUE TO COVID-19 VIRUS: Primary | ICD-10-CM

## 2022-01-03 DIAGNOSIS — N39.0 URINARY TRACT INFECTION WITHOUT HEMATURIA, SITE UNSPECIFIED: ICD-10-CM

## 2022-01-03 DIAGNOSIS — J96.01 ACUTE RESPIRATORY FAILURE WITH HYPOXIA (HCC): ICD-10-CM

## 2022-01-03 DIAGNOSIS — R06.00 DYSPNEA, UNSPECIFIED TYPE: ICD-10-CM

## 2022-01-03 DIAGNOSIS — U07.1 PNEUMONIA DUE TO COVID-19 VIRUS: Primary | ICD-10-CM

## 2022-01-03 DIAGNOSIS — E83.42 HYPOMAGNESEMIA: ICD-10-CM

## 2022-01-03 PROBLEM — I44.7 LEFT BUNDLE BRANCH BLOCK: Status: ACTIVE | Noted: 2021-10-11

## 2022-01-03 PROBLEM — I45.4 IVCD (INTRAVENTRICULAR CONDUCTION DEFECT): Status: ACTIVE | Noted: 2021-10-11

## 2022-01-03 PROCEDURE — 99215 OFFICE O/P EST HI 40 MIN: CPT | Performed by: FAMILY MEDICINE

## 2022-01-03 PROCEDURE — 1111F DSCHRG MED/CURRENT MED MERGE: CPT | Performed by: FAMILY MEDICINE

## 2022-01-03 RX ORDER — M-VIT,TX,IRON,MINS/CALC/FOLIC 27MG-0.4MG
1 TABLET ORAL DAILY
COMMUNITY

## 2022-01-03 RX ORDER — NITROFURANTOIN 25; 75 MG/1; MG/1
100 CAPSULE ORAL 2 TIMES DAILY
Qty: 10 CAPSULE | Refills: 0 | Status: SHIPPED | OUTPATIENT
Start: 2022-01-03 | End: 2022-01-08

## 2022-01-03 RX ORDER — ONDANSETRON 4 MG/1
TABLET, ORALLY DISINTEGRATING ORAL
COMMUNITY
Start: 2021-12-13 | End: 2022-01-14 | Stop reason: SDUPTHER

## 2022-01-03 RX ORDER — PANTOPRAZOLE SODIUM 40 MG/1
40 TABLET, DELAYED RELEASE ORAL NIGHTLY
COMMUNITY
Start: 2021-10-11

## 2022-01-03 RX ORDER — RIVAROXABAN 20 MG/1
TABLET, FILM COATED ORAL
COMMUNITY
Start: 2021-12-30

## 2022-01-03 RX ORDER — METOPROLOL TARTRATE 50 MG/1
TABLET, FILM COATED ORAL
COMMUNITY
Start: 2021-12-30

## 2022-01-03 ASSESSMENT — PATIENT HEALTH QUESTIONNAIRE - PHQ9
SUM OF ALL RESPONSES TO PHQ QUESTIONS 1-9: 0
2. FEELING DOWN, DEPRESSED OR HOPELESS: 0
SUM OF ALL RESPONSES TO PHQ QUESTIONS 1-9: 0
SUM OF ALL RESPONSES TO PHQ QUESTIONS 1-9: 0
1. LITTLE INTEREST OR PLEASURE IN DOING THINGS: 0
SUM OF ALL RESPONSES TO PHQ9 QUESTIONS 1 & 2: 0
SUM OF ALL RESPONSES TO PHQ QUESTIONS 1-9: 0

## 2022-01-03 NOTE — PROGRESS NOTES
Subjective:     Patient ID: Brian Adhikari is a 49 y.o. female    HPI  This patient is seen as a hospital follow-up with her son. She was hospitalized in excess of 3 weeks for acute respiratory failure and pneumonia secondary to COVID-19 virus. She was acutely hypoxic with a PO2 in the 50s. Reviewing the records she also had significant metabolic imbalances which were corrected. Her past medical history also included left bundle branch block interventricular conduction delay. There are impression patient she feels fatigued she does have some congestion has some cough minimal shortness of breath. She does have home oxygen but is not currently wearing it as her pulse ox's have been favorable. She denies any chest pain or palpitations at this point in time. She feels very tammy to be alive. Review of Systems   Constitutional: Positive for fatigue. Negative for activity change, appetite change and fever. HENT: Positive for congestion. Negative for sore throat. Eyes: Negative for visual disturbance. Respiratory: Positive for cough and shortness of breath. Negative for chest tightness. Cardiovascular: Negative for chest pain, palpitations and leg swelling. Gastrointestinal: Negative for abdominal pain, constipation and diarrhea. Endocrine: Negative for cold intolerance. Genitourinary: Negative for dysuria and urgency. Musculoskeletal: Negative for back pain. Neurological: Negative for dizziness, syncope and headaches. Hematological: Does not bruise/bleed easily. Psychiatric/Behavioral: Negative for confusion and sleep disturbance. The patient is not nervous/anxious. Objective:     Physical Exam  Vitals and nursing note reviewed. Constitutional:       General: She is not in acute distress. Appearance: Normal appearance. She is ill-appearing. HENT:      Head: Normocephalic.       Right Ear: External ear normal.      Left Ear: External ear normal.      Nose: Nose normal.      Mouth/Throat:      Mouth: Mucous membranes are moist.      Pharynx: Oropharynx is clear. Eyes:      Conjunctiva/sclera: Conjunctivae normal.      Pupils: Pupils are equal, round, and reactive to light. Cardiovascular:      Rate and Rhythm: Normal rate and regular rhythm. Pulses: Normal pulses. Heart sounds: Normal heart sounds. No murmur heard. Pulmonary:      Effort: Pulmonary effort is normal.      Breath sounds: Normal breath sounds. No wheezing. Abdominal:      Palpations: Abdomen is soft. Tenderness: There is no abdominal tenderness. Musculoskeletal:         General: Normal range of motion. Cervical back: Neck supple. Right lower leg: No edema. Left lower leg: No edema. Lymphadenopathy:      Cervical: No cervical adenopathy. Skin:     General: Skin is warm and dry. Capillary Refill: Capillary refill takes less than 2 seconds. Neurological:      General: No focal deficit present. Mental Status: She is alert and oriented to person, place, and time. Psychiatric:         Mood and Affect: Mood normal.         Behavior: Behavior normal.           Assessment/Plan:     1. Pneumonia due to COVID-19 virus    2. Acute respiratory failure with hypoxia ([facility])    3. Dyspnea, unspecified type    4. Urinary tract infection without hematuria, site unspecified    5. Hypomagnesemia            Ramila was seen today for follow-up from hospital and [facility] care. Diagnoses and all orders for this visit:    Pneumonia due to COVID-19 virus  Current medications are reviewed patient appears stable we will recheck her lab work before the next visit    Acute respiratory failure with hypoxia ([address]  Has home O2 therapy.   Her pulse ox readings have been stable  Dyspnea, unspecified type  Pretty much resolved    Urinary tract infection without hematuria, site unspecified  Being treated with Macrobid    Hypomagnesemia  Mag replacement therapy done needs a recheck  Other orders  -     nitrofurantoin, macrocrystal-monohydrate, (MACROBID) 100 MG capsule; Take 1 capsule by mouth 2 times daily for 5 days Take twice daily until empty  Stable course if she develops a worsening of her symptoms please call the office otherwise I will likely see her in 2 weeks hold up she had a home good            Omari Arciniega MD    Please note that this chart was generated using voice recognition Dragon dictation software. Although every effort was made to ensure the accuracy of this automated transcription, some errors in transcription may have occurred.

## 2022-01-04 ENCOUNTER — TELEPHONE (OUTPATIENT)
Dept: PRIMARY CARE CLINIC | Age: 57
End: 2022-01-04

## 2022-01-04 NOTE — TELEPHONE ENCOUNTER
Needs verbal that you will follow for home care. Requested home care service s for Skilled nursing OT and PT.  Please Advise

## 2022-01-04 NOTE — TELEPHONE ENCOUNTER
Home care order created with PT and and nursing recommendations, awaiting office note to be signed and then this will be faxed to [facility] per patient request due to insurance.

## 2022-01-09 ASSESSMENT — ENCOUNTER SYMPTOMS
CONSTIPATION: 0
SHORTNESS OF BREATH: 1
DIARRHEA: 0
BACK PAIN: 0
CHEST TIGHTNESS: 0
COUGH: 1
ABDOMINAL PAIN: 0
SORE THROAT: 0

## 2022-01-14 ENCOUNTER — TELEPHONE (OUTPATIENT)
Dept: PRIMARY CARE CLINIC | Age: 57
End: 2022-01-14

## 2022-01-14 ENCOUNTER — OFFICE VISIT (OUTPATIENT)
Dept: PRIMARY CARE CLINIC | Age: 57
End: 2022-01-14
Payer: MEDICARE

## 2022-01-14 VITALS
BODY MASS INDEX: 29.71 KG/M2 | HEART RATE: 101 BPM | TEMPERATURE: 96.8 F | DIASTOLIC BLOOD PRESSURE: 82 MMHG | SYSTOLIC BLOOD PRESSURE: 123 MMHG | WEIGHT: 174 LBS | OXYGEN SATURATION: 96 % | HEIGHT: 64 IN

## 2022-01-14 DIAGNOSIS — U09.9 POST-COVID-19 CONDITION: Primary | ICD-10-CM

## 2022-01-14 PROCEDURE — G8428 CUR MEDS NOT DOCUMENT: HCPCS | Performed by: FAMILY MEDICINE

## 2022-01-14 PROCEDURE — 99214 OFFICE O/P EST MOD 30 MIN: CPT | Performed by: FAMILY MEDICINE

## 2022-01-14 PROCEDURE — G8484 FLU IMMUNIZE NO ADMIN: HCPCS | Performed by: FAMILY MEDICINE

## 2022-01-14 PROCEDURE — 3017F COLORECTAL CA SCREEN DOC REV: CPT | Performed by: FAMILY MEDICINE

## 2022-01-14 PROCEDURE — G8417 CALC BMI ABV UP PARAM F/U: HCPCS | Performed by: FAMILY MEDICINE

## 2022-01-14 PROCEDURE — 1036F TOBACCO NON-USER: CPT | Performed by: FAMILY MEDICINE

## 2022-01-14 RX ORDER — DOXYCYCLINE HYCLATE 100 MG
100 TABLET ORAL 2 TIMES DAILY
Qty: 28 TABLET | Refills: 0 | Status: SHIPPED | OUTPATIENT
Start: 2022-01-14 | End: 2022-01-28

## 2022-01-14 RX ORDER — ALBUTEROL SULFATE 0.63 MG/3ML
1 SOLUTION RESPIRATORY (INHALATION) EVERY 6 HOURS PRN
COMMUNITY
End: 2022-01-14 | Stop reason: SDUPTHER

## 2022-01-14 RX ORDER — ALBUTEROL SULFATE 0.63 MG/3ML
1 SOLUTION RESPIRATORY (INHALATION) EVERY 6 HOURS PRN
Qty: 270 ML | Refills: 1 | Status: SHIPPED | OUTPATIENT
Start: 2022-01-14 | End: 2022-02-13

## 2022-01-14 RX ORDER — PREDNISONE 20 MG/1
20 TABLET ORAL DAILY
Qty: 7 TABLET | Refills: 0 | Status: SHIPPED | OUTPATIENT
Start: 2022-01-14 | End: 2022-01-21

## 2022-01-14 RX ORDER — ONDANSETRON 4 MG/1
TABLET, ORALLY DISINTEGRATING ORAL
Qty: 20 TABLET | Refills: 0 | Status: SHIPPED | OUTPATIENT
Start: 2022-01-14

## 2022-01-14 NOTE — TELEPHONE ENCOUNTER
Patient has been home from hospital for 2 weeks now. She was feeling better but then this week she's had a bad headache that won't go away, vertigo, a fever of 101-103.6, very fatigued, and has had some mucus. Patient has pneumonia secondary to the covid. Dr. Cindi John called just as I hung up with the patient and said it would be best for patient to come in to be seen.

## 2022-01-17 ASSESSMENT — ENCOUNTER SYMPTOMS
GASTROINTESTINAL NEGATIVE: 1
CHEST TIGHTNESS: 1
TROUBLE SWALLOWING: 1
COUGH: 1
SORE THROAT: 1

## 2022-01-17 NOTE — PROGRESS NOTES
Subjective:      Patient ID: Vladimir Gustafson is a 49 y.o. female. Just out of lengthy stay in hospital for Covid  Still struggling with activity, fever , continuous cough  Cant eat      Review of Systems   Constitutional: Positive for activity change, fatigue and fever. HENT: Positive for sore throat and trouble swallowing. Respiratory: Positive for cough and chest tightness. Gastrointestinal: Negative. Musculoskeletal: Negative. Psychiatric/Behavioral: Negative. All other systems reviewed and are negative. Objective:   Physical Exam  Vitals reviewed. HENT:      Head: Normocephalic. Nose: Nose normal.      Mouth/Throat:      Pharynx: Oropharynx is clear. Pulmonary:      Breath sounds: Stridor present. Musculoskeletal:         General: Normal range of motion. Neurological:      General: No focal deficit present. Mental Status: She is alert. Psychiatric:         Mood and Affect: Mood normal.         Assessment:      1. Post-COVID-19 condition            Plan:      [facility] for other. Diagnoses and all orders for this visit:    Post-COVID-19 condition    Other orders  -     ondansetron (ZOFRAN-ODT) 4 MG disintegrating tablet; dissolve 1 tablet ON TONGUE every 8 hours for 3 days  -     doxycycline hyclate (VIBRA-TABS) 100 MG tablet; Take 1 tablet by mouth 2 times daily for 14 days  -     predniSONE (DELTASONE) 20 MG tablet; Take 1 tablet by mouth daily for 7 days  -     albuterol (ACCUNEB) 0.63 MG/3ML nebulizer solution;  Take 3 mLs by nebulization every 6 hours as needed for Shortness of Breath    prophlaxis for supportive care          Electronically signed by Wallace Bejarano MD on 1/14/2022 at 8:16 AM

## 2022-01-25 ENCOUNTER — HOSPITAL ENCOUNTER (OUTPATIENT)
Age: 57
Setting detail: SPECIMEN
Discharge: HOME | End: 2022-01-25

## 2022-01-25 ENCOUNTER — OFFICE VISIT (OUTPATIENT)
Dept: PRIMARY CARE CLINIC | Age: 57
End: 2022-01-25
Payer: MEDICARE

## 2022-01-25 VITALS
SYSTOLIC BLOOD PRESSURE: 116 MMHG | BODY MASS INDEX: 30.22 KG/M2 | WEIGHT: 177 LBS | HEIGHT: 64 IN | DIASTOLIC BLOOD PRESSURE: 78 MMHG | OXYGEN SATURATION: 97 % | HEART RATE: 96 BPM

## 2022-01-25 DIAGNOSIS — J12.82 PNEUMONIA DUE TO COVID-19 VIRUS: ICD-10-CM

## 2022-01-25 DIAGNOSIS — U07.1 PNEUMONIA DUE TO COVID-19 VIRUS: ICD-10-CM

## 2022-01-25 DIAGNOSIS — E83.42 HYPOMAGNESEMIA: ICD-10-CM

## 2022-01-25 DIAGNOSIS — R06.00 DYSPNEA, UNSPECIFIED TYPE: ICD-10-CM

## 2022-01-25 DIAGNOSIS — U09.9 POST-COVID-19 CONDITION: Primary | ICD-10-CM

## 2022-01-25 LAB
ABSOLUTE EOS #: 0.33 K/UL (ref 0–0.44)
ABSOLUTE IMMATURE GRANULOCYTE: 0.04 K/UL (ref 0–0.3)
ABSOLUTE LYMPH #: 2.02 K/UL (ref 1.1–3.7)
ABSOLUTE MONO #: 0.65 K/UL (ref 0.1–1.2)
BASOPHILS # BLD: 1 % (ref 0–2)
BASOPHILS ABSOLUTE: 0.07 K/UL (ref 0–0.2)
DIFFERENTIAL TYPE: ABNORMAL
EOSINOPHILS RELATIVE PERCENT: 5 % (ref 1–4)
HCT VFR BLD CALC: 35.6 % (ref 36.3–47.1)
HEMOGLOBIN: 11.6 G/DL (ref 11.9–15.1)
IMMATURE GRANULOCYTES: 1 %
LYMPHOCYTES # BLD: 30 % (ref 24–43)
MCH RBC QN AUTO: 29.7 PG (ref 25.2–33.5)
MCHC RBC AUTO-ENTMCNC: 32.6 G/DL (ref 28.4–34.8)
MCV RBC AUTO: 91 FL (ref 82.6–102.9)
MONOCYTES # BLD: 10 % (ref 3–12)
NRBC AUTOMATED: 0 PER 100 WBC
PDW BLD-RTO: 15.2 % (ref 11.8–14.4)
PLATELET # BLD: 347 K/UL (ref 138–453)
PLATELET ESTIMATE: ABNORMAL
PMV BLD AUTO: 10 FL (ref 8.1–13.5)
RBC # BLD: 3.91 M/UL (ref 3.95–5.11)
RBC # BLD: ABNORMAL 10*6/UL
SEG NEUTROPHILS: 53 % (ref 36–65)
SEGMENTED NEUTROPHILS ABSOLUTE COUNT: 3.66 K/UL (ref 1.5–8.1)
WBC # BLD: 6.8 K/UL (ref 3.5–11.3)
WBC # BLD: ABNORMAL 10*3/UL

## 2022-01-25 PROCEDURE — 99214 OFFICE O/P EST MOD 30 MIN: CPT | Performed by: FAMILY MEDICINE

## 2022-01-25 PROCEDURE — G8417 CALC BMI ABV UP PARAM F/U: HCPCS | Performed by: FAMILY MEDICINE

## 2022-01-25 PROCEDURE — 1036F TOBACCO NON-USER: CPT | Performed by: FAMILY MEDICINE

## 2022-01-25 PROCEDURE — G8427 DOCREV CUR MEDS BY ELIG CLIN: HCPCS | Performed by: FAMILY MEDICINE

## 2022-01-25 PROCEDURE — 3017F COLORECTAL CA SCREEN DOC REV: CPT | Performed by: FAMILY MEDICINE

## 2022-01-25 PROCEDURE — G8484 FLU IMMUNIZE NO ADMIN: HCPCS | Performed by: FAMILY MEDICINE

## 2022-01-25 ASSESSMENT — ENCOUNTER SYMPTOMS
SORE THROAT: 0
CHEST TIGHTNESS: 0
DIARRHEA: 0
ABDOMINAL PAIN: 0
COUGH: 0
CONSTIPATION: 0
BACK PAIN: 0
SHORTNESS OF BREATH: 0

## 2022-01-25 ASSESSMENT — PATIENT HEALTH QUESTIONNAIRE - PHQ9
SUM OF ALL RESPONSES TO PHQ QUESTIONS 1-9: 0
1. LITTLE INTEREST OR PLEASURE IN DOING THINGS: 0
SUM OF ALL RESPONSES TO PHQ QUESTIONS 1-9: 0
2. FEELING DOWN, DEPRESSED OR HOPELESS: 0
SUM OF ALL RESPONSES TO PHQ QUESTIONS 1-9: 0
SUM OF ALL RESPONSES TO PHQ9 QUESTIONS 1 & 2: 0
SUM OF ALL RESPONSES TO PHQ QUESTIONS 1-9: 0

## 2022-01-25 NOTE — PROGRESS NOTES
Subjective:     Patient ID: Tawanda El is a 49 y.o. female    HPI  Emily Rosado returns today for recheck after her COVID 19 hospitalization. Much improved  No chest pain   Taste and smell have returned  Uses O2 twice daily   Does not sleep well but never has     migraines have returned as well as somewhat of a burning tingling sensation to her scalp from time to time. She has not had her labs rechecked but well today. She is taking the supplements to help her recover from her infection  Review of Systems   Constitutional: Negative for activity change, appetite change, fatigue and fever. HENT: Negative for sore throat. Eyes: Negative for visual disturbance. Respiratory: Negative for cough, chest tightness and shortness of breath. Cardiovascular: Negative for chest pain, palpitations and leg swelling. Gastrointestinal: Negative for abdominal pain, constipation and diarrhea. Endocrine: Negative for cold intolerance. Genitourinary: Negative for dysuria and urgency. Musculoskeletal: Negative for back pain. Neurological: Negative for dizziness, syncope and headaches. Hematological: Does not bruise/bleed easily. Psychiatric/Behavioral: Negative for confusion and sleep disturbance. The patient is not nervous/anxious. Objective:     Physical Exam  Vitals and nursing note reviewed. Constitutional:       Appearance: Normal appearance. HENT:      Head: Normocephalic. Right Ear: External ear normal.      Left Ear: External ear normal.      Nose: Nose normal.      Mouth/Throat:      Mouth: Mucous membranes are moist.      Pharynx: Oropharynx is clear. Eyes:      Conjunctiva/sclera: Conjunctivae normal.      Pupils: Pupils are equal, round, and reactive to light. Cardiovascular:      Rate and Rhythm: Normal rate and regular rhythm. Pulses: Normal pulses. Heart sounds: Normal heart sounds. No murmur heard.       Pulmonary:      Effort: Pulmonary effort is normal. No respiratory distress. Breath sounds: Normal breath sounds. No wheezing. Musculoskeletal:         General: Normal range of motion. Cervical back: Neck supple. Right lower leg: No edema. Left lower leg: No edema. Lymphadenopathy:      Cervical: No cervical adenopathy. Skin:     General: Skin is warm and dry. Capillary Refill: Capillary refill takes less than 2 seconds. Neurological:      General: No focal deficit present. Mental Status: She is alert and oriented to person, place, and time. Psychiatric:         Mood and Affect: Mood normal.         Behavior: Behavior normal.         Assessment/Plan:     1. Post-COVID-19 condition    2. Pneumonia due to COVID-19 virus    3. Hypomagnesemia          Ramila was seen today for other. Diagnoses and all orders for this visit:    Post-COVID-19 condition  Recovering well  Pneumonia due to COVID-19 virus  Resolved  Hypomagnesemia  Recheck labs        Carol Anthony MD    Please note that this chart was generated using voice recognition Dragon dictation software. Although every effort was made to ensure the accuracy of this automated transcription, some errors in transcription may have occurred.

## 2022-01-26 ENCOUNTER — TELEPHONE (OUTPATIENT)
Dept: PRIMARY CARE CLINIC | Age: 57
End: 2022-01-26

## 2022-01-26 DIAGNOSIS — E83.42 HYPOMAGNESEMIA: Primary | ICD-10-CM

## 2022-01-26 LAB
ALBUMIN SERPL-MCNC: 4 G/DL (ref 3.5–5.2)
ALBUMIN/GLOBULIN RATIO: 1.3 (ref 1–2.5)
ALP BLD-CCNC: 69 U/L (ref 35–104)
ALT SERPL-CCNC: 17 U/L (ref 5–33)
ANION GAP SERPL CALCULATED.3IONS-SCNC: 15 MMOL/L (ref 9–17)
AST SERPL-CCNC: 23 U/L
BILIRUB SERPL-MCNC: 0.65 MG/DL (ref 0.3–1.2)
BUN BLDV-MCNC: 8 MG/DL (ref 6–20)
BUN/CREAT BLD: ABNORMAL (ref 9–20)
CALCIUM SERPL-MCNC: 9 MG/DL (ref 8.6–10.4)
CHLORIDE BLD-SCNC: 98 MMOL/L (ref 98–107)
CO2: 24 MMOL/L (ref 20–31)
CREAT SERPL-MCNC: 0.65 MG/DL (ref 0.5–0.9)
GFR AFRICAN AMERICAN: >60 ML/MIN
GFR NON-AFRICAN AMERICAN: >60 ML/MIN
GFR SERPL CREATININE-BSD FRML MDRD: ABNORMAL ML/MIN/{1.73_M2}
GFR SERPL CREATININE-BSD FRML MDRD: ABNORMAL ML/MIN/{1.73_M2}
GLUCOSE BLD-MCNC: 98 MG/DL (ref 70–99)
MAGNESIUM: 0.5 MG/DL (ref 1.6–2.6)
POTASSIUM SERPL-SCNC: 3.4 MMOL/L (ref 3.7–5.3)
SODIUM BLD-SCNC: 137 MMOL/L (ref 135–144)
TOTAL PROTEIN: 7.1 G/DL (ref 6.4–8.3)

## 2022-01-26 RX ORDER — MAGNESIUM SULFATE HEPTAHYDRATE 40 MG/ML
INJECTION, SOLUTION INTRAVENOUS
Qty: 100 ML | Refills: 0 | Status: SHIPPED | OUTPATIENT
Start: 2022-01-26

## 2022-01-26 NOTE — TELEPHONE ENCOUNTER
Critical low magnesium 0.5. [facility]. Julita Sai [phone] sttes they need an order for magnesium replacement infusion order with patient diagnosis and the last visit progress note. Requests this be faxed to [phone]. Needs to know how much magnesium, if there are any lab parameters. Dr. Robin Cordova states he will contact pharmacist to determine dosage then send order over.

## 2022-01-27 ENCOUNTER — TELEPHONE (OUTPATIENT)
Dept: ONCOLOGY | Age: 57
End: 2022-01-27

## 2022-01-27 NOTE — TELEPHONE ENCOUNTER
Received order from Dr Basilio Mcallister office for mag infusion for patient    LVM with patient to contact office to schedule    Order scanned into pt chart and placed in pending left message drawer    Electronically signed by Pawel Auguste on 1/27/2022 at 9:25 AM

## 2022-01-28 DIAGNOSIS — E83.42 HYPOMAGNESEMIA: ICD-10-CM

## 2022-01-28 RX ORDER — SODIUM CHLORIDE 9 MG/ML
25 INJECTION, SOLUTION INTRAVENOUS PRN
OUTPATIENT
Start: 2022-01-28

## 2022-01-28 RX ORDER — SODIUM CHLORIDE 0.9 % (FLUSH) 0.9 %
5-40 SYRINGE (ML) INJECTION PRN
OUTPATIENT
Start: 2022-01-28

## 2022-01-28 RX ORDER — HEPARIN SODIUM (PORCINE) LOCK FLUSH IV SOLN 100 UNIT/ML 100 UNIT/ML
500 SOLUTION INTRAVENOUS PRN
OUTPATIENT
Start: 2022-01-28

## 2022-02-14 ENCOUNTER — HOSPITAL ENCOUNTER (EMERGENCY)
Age: 57
Discharge: HOME | End: 2022-02-14
Attending: EMERGENCY MEDICINE
Payer: MEDICARE

## 2022-02-14 VITALS
DIASTOLIC BLOOD PRESSURE: 92 MMHG | HEART RATE: 77 BPM | BODY MASS INDEX: 29.02 KG/M2 | RESPIRATION RATE: 16 BRPM | HEIGHT: 64 IN | TEMPERATURE: 98.4 F | SYSTOLIC BLOOD PRESSURE: 156 MMHG | WEIGHT: 170 LBS | OXYGEN SATURATION: 96 %

## 2022-02-14 DIAGNOSIS — H20.9 IRITIS: Primary | ICD-10-CM

## 2022-02-14 PROCEDURE — 99284 EMERGENCY DEPT VISIT MOD MDM: CPT

## 2022-02-14 PROCEDURE — 6370000000 HC RX 637 (ALT 250 FOR IP): Performed by: PHYSICIAN ASSISTANT

## 2022-02-14 RX ORDER — CIPROFLOXACIN HYDROCHLORIDE 3.5 MG/ML
2 SOLUTION/ DROPS TOPICAL
COMMUNITY

## 2022-02-14 RX ORDER — TETRACAINE HYDROCHLORIDE 5 MG/ML
2 SOLUTION OPHTHALMIC ONCE
Status: COMPLETED | OUTPATIENT
Start: 2022-02-14 | End: 2022-02-14

## 2022-02-14 RX ORDER — CYCLOPENTOLATE HYDROCHLORIDE 10 MG/ML
1 SOLUTION/ DROPS OPHTHALMIC ONCE
Status: COMPLETED | OUTPATIENT
Start: 2022-02-14 | End: 2022-02-14

## 2022-02-14 RX ADMIN — TETRACAINE HYDROCHLORIDE 2 DROP: 5 SOLUTION OPHTHALMIC at 16:10

## 2022-02-14 RX ADMIN — CYCLOPENTOLATE HYDROCHLORIDE 1 DROP: 10 SOLUTION/ DROPS OPHTHALMIC at 16:10

## 2022-02-14 ASSESSMENT — PAIN DESCRIPTION - FREQUENCY: FREQUENCY: CONTINUOUS

## 2022-02-14 ASSESSMENT — VISUAL ACUITY: OS: 20/25

## 2022-02-14 ASSESSMENT — PAIN DESCRIPTION - ORIENTATION: ORIENTATION: RIGHT

## 2022-02-14 ASSESSMENT — PAIN SCALES - GENERAL: PAINLEVEL_OUTOF10: 4

## 2022-02-14 ASSESSMENT — PAIN DESCRIPTION - DESCRIPTORS: DESCRIPTORS: SORE;CONSTANT

## 2022-02-14 ASSESSMENT — PAIN DESCRIPTION - PAIN TYPE: TYPE: ACUTE PAIN

## 2022-02-14 NOTE — ED PROVIDER NOTES
[address] [facility] ED  [address] [facility] [address]  Phone: [phone]  Fax: [phone]      eMERGENCY dEPARTMENT eNCOUnter      Pt Name: Bigg Hummel  MRN: [id number]  [place] 1965  Date of evaluation: 2/14/22      CHIEF COMPLAINT:  Chief Complaint   Patient presents with    Eye Problem     right eye pain, x1 week. began as cloudy, itchy       HISTORY OF PRESENT ILLNESS    Bigg Hummel is a 49 y.o. female who presents with eye complaint:    Location/Symptom: eye redness, pain, tearing  Timing/Onset: 7 days  Context/Setting: Patient here for evaluation of nontraumatic irritation/tearing/redness of her right eye. Vision is blurred on this right side but she denies any diplopia or loss of visual field. She denies any other neurologic deficits or changes patient states she was at urgent care 2 days ago and was prescribed Cipro drops for a suspected conjunctivitis with some short-term improvement of her symptoms but they have returned. Patient denies any association with crusting or matting, only tearing and photophobia. Patient describes pain of the eye and tenderness when she touches it but she denies any swelling around the eye or feeling that her eyeball itself is larger than the unaffected eye. Patient has no previous ophthalmologic history other than wearing glasses. Patient does have an eye specialist that she sees as needed every few years. Patient has no known family history of glaucoma or other concerning acute ophthalmologic conditions. She does report some congestion on that right side. Patient reports some mild feelings of foreign body sensation at times. There is not one precipitating incident where she thought she injured her eye prior to onset of symptoms that were gradual in nature. Vision changes? YES   Trauma? NO   Grinding? NO  Matting or Discharge? NO  Wears contacts?   NO    Quality:   As above  Duration:   constant  Modifying Factors: Worse with blinking, bright lights  Severity:   moderate    Nursing Notes were reviewed. REVIEW OF SYSTEMS       Constitutional: Denies recent fever, chills. Eyes:  Per HPI  Neck: No neck pain. Respiratory: Denies recent shortness of breath. Cardiac:  Denies recent chest pain. GI:  Denies abdominal pain/nausea/vomiting/diarrhea. : Denies dysuria. Musculoskeletal: Denies focal weakness. Neurologic: Denies headache or focal weakness. Skin:  Denies any rash. Negative in 10 essential Systems except as mentioned above and in the HPI. PAST MEDICAL HISTORY   PMH:  has a past medical history of Anxiety, GERD (gastroesophageal reflux disease), Kidney stones, Migraine, Palpitations, Pneumonia, Septic shock ([address] and Sinus tachycardia. Surgical History:  has a past surgical history that includes  section; Appendectomy; Hysterectomy; Hysterectomy, total abdominal; and Hysterectomy, vaginal.  Social History:  reports that she has never smoked. She has never used smokeless tobacco. She reports that she does not drink alcohol and does not use drugs. Family History: None  Psychiatric History: None    Allergies:is allergic to diltiazem, erythromycin, and other. PHYSICAL EXAM     INITIAL VITALS: BP (!) 156/92   Pulse 77   Temp 98.4 °F (36.9 °C) (Oral)   Resp 16   Ht 5' 4\" (1.626 m)   Wt 77.1 kg (170 lb)   SpO2 96%   BMI 29.18 kg/m²   Constitutional:  Well developed   Eyes: Moderate scleral injection without chemosis. (+) tearing and photophobia. No loose or retained foreign bodies appreciated. Anterior chamber intact but generally hazy, no particulate matter appreciated. No hyphema. Very sluggish reaction of right pupil, this is reported as chronic. Left eye/pupil wnl. EOMI bilat. Lid of affected eye everted, normal to inspection and no FB. No proptosis. No significant edema or erythema periorbitally.   HENT:  Atraumatic, external ears normal, nose normal, Respiratory:  Clear to auscultation bilaterally with good air exchange, no W/R/R  Cardiovascular:  RRR with normal S1 and S2  Musculoskeletal:  Normal to inspection  Integument:   No rash. Neurologic:  Alert, age appropriate mentation, no focal deficits noted       DIAGNOSTIC RESULTS     EKG: All EKG's are interpreted by the Emergency Department Physician who either signs or Co-signs this chart in the absence of a cardiologist.  Not indicated    RADIOLOGY:   Reviewed the radiologist:  No orders to display     Not indicated    LABS:  Labs Reviewed - No data to display  Not indicated          Thea Ora Lamp Exam Procedure Note  Indication: eye pain and eye redness  Procedure: The patient was placed in the appropriate position. Anesthesia was obtained using proparacaine drops in the right eye. Fluorescein staining was performed in the right eye and revealed no eye abrasions or increased uptake. No FB visualized/appreciated. The patient tolerated the procedure well. Complications: None      Slit Lamp Exam Procedure Note  Indication: eye pain and eye redness  Procedure: The patient was placed in the appropriate position. Anesthesia was obtained using proparacaine drops in the right eye. Fluorescein staining was performed in the right eye and revealed no eye abrasions or increased uptake. The slit lamp exam findings were as follows: Right Eye: Anterior Chamber: flair present. The patient tolerated the procedure well. Complications: None        EMERGENCY DEPARTMENT COURSE:     1502  No corneal abrasion, suspect uveitis or some other process. Patient has very poor vision on the right due to blurring but not to actual visual field loss. Discussed with attending thinking about possible uveitis. Slit-lamp does not demonstrate any corneal abrasions or retained or loose foreign body. No proptosis. IOP of affected eye was 21. Visual acuity poor on right due to blurriness.     1532  Page out to Dr Jonathan Forrester for input and outpt f/u.     1602  Information relayed to Isidoro Almendarez in real time via his staff, based on my description provided he would like Cyclo gtt here and then outpt f/u with his office tomorrow at 12:45pm.  No further orders for us. Orders Placed This Encounter   Medications    cyclopentolate (CYCLOGYL) 1 % ophthalmic solution 1 drop    tetracaine (TETRAVISC) 0.5 % ophthalmic solution 2 drop       CONSULTS:  None      FINAL IMPRESSION      1. Iritis          DISPOSITION/PLAN:  DISPOSITION Decision To Discharge 02/14/2022 04:01:02 PM        PATIENT REFERRED TO:  Lizz Nunez MD  [address]  [phone]      Please see at your already established appt at 12:45pm    [facility] ED  [address]   [place] [address]  [phone]  Go to   for worsening of symptoms      DISCHARGE MEDICATIONS:  Discharge Medication List as of 2/14/2022  4:01 PM          (Please note that portions of this note were completed with a voice recognition program.  Efforts were made to edit the dictations but occasionally words are mis-transcribed.)    CECILE Ravi PA-C  02/14/22 Matt Canales PA-C  02/16/22 [id number]

## 2022-02-14 NOTE — ED PROVIDER NOTES
[address] [facility] ED  [address] [facility] [address] [facility] [address]  Phone: [phone]  Fax: [phone]      Attending Physician Attestation    I performed a history and physical examination of the patient and discussed management with the mid level provider. I reviewed the mid level provider's note and agree with the documented findings and plan of care. Any areas of disagreement are noted on the chart. I was personally present for the key portions of any procedures. I have documented in the chart those procedures where I was not present during the key portions. I have reviewed the emergency nurses triage note. I agree with the chief complaint, past medical history, past surgical history, allergies, medications, social and family history as documented unless otherwise noted below. Documentation of the HPI, Physical Exam and Medical Decision Making performed by mid level providers is based on my personal performance of the HPI, PE and MDM. For Physician Assistant/ Nurse Practitioner cases/documentation I have personally evaluated this patient and have completed at least one if not all key elements of the E/M (history, physical exam, and MDM). Additional findings are as noted. CHIEF COMPLAINT       Chief Complaint   Patient presents with    Eye Problem     right eye pain, x1 week. began as cloudy, itchy         HISTORY OF PRESENT ILLNESS    Brian Adhikari is a 49 y.o. female who presents with right eye pain for 1 week, recent covid infection. PAST MEDICAL HISTORY    has a past medical history of Anxiety, GERD (gastroesophageal reflux disease), Kidney stones, Migraine, Palpitations, Pneumonia, Septic shock ([address] and Sinus tachycardia. SURGICAL HISTORY      has a past surgical history that includes  section; Appendectomy; Hysterectomy;  Hysterectomy, total abdominal; and Hysterectomy, vaginal.    CURRENT MEDICATIONS       Previous Medications    ALBUTEROL (ACCUNEB) 0.63 MG/3ML NEBULIZER SOLUTION    Take 3 mLs by nebulization every 6 hours as needed for Shortness of Breath    BUTALBITAL-ACETAMINOPHEN-CAFFEINE (FIORICET, ESGIC) -40 MG PER TABLET    Take 50 mg by mouth 4 times daily    CIPROFLOXACIN (CILOXAN) 0.3 % OPHTHALMIC SOLUTION    Place 2 drops into the right eye every 2 hours    CITALOPRAM (CELEXA) 20 MG TABLET    Take 1 tablet by mouth daily    MAGNESIUM SULFATE 4 GM/100ML INFUSION    Infuse 4g over 4 hours    METOPROLOL TARTRATE (LOPRESSOR) 50 MG TABLET    take 1 tablet by mouth twice a day    MULTIPLE VITAMINS-MINERALS (THERAPEUTIC MULTIVITAMIN-MINERALS) TABLET    Take 1 tablet by mouth daily    NIACIN 500 MG TABLET    Take 500 mg by mouth daily (with breakfast)    OMEPRAZOLE (PRILOSEC) 20 MG DELAYED RELEASE CAPSULE    Take 1 capsule by mouth Daily    ONDANSETRON (ZOFRAN-ODT) 4 MG DISINTEGRATING TABLET    dissolve 1 tablet ON TONGUE every 8 hours for 3 days    PANTOPRAZOLE (PROTONIX) 40 MG TABLET    Take 40 mg by mouth nightly    XARELTO 20 MG TABS TABLET    take 1 tablet by mouth once daily       ALLERGIES     is allergic to diltiazem, erythromycin, and other. FAMILY HISTORY     She indicated that her mother is alive. She indicated that her father is . family history includes Arthritis in her father; Cancer in her father; Diabetes in her father; High Blood Pressure in her mother; Kidney Disease in her father; Other in her mother. SOCIAL HISTORY      reports that she has never smoked. She has never used smokeless tobacco. She reports that she does not drink alcohol and does not use drugs. PHYSICAL EXAM     INITIAL VITALS:  height is 5' 4\" (1.626 m) and weight is 77.1 kg (170 lb). Her oral temperature is 98.4 °F (36.9 °C). Her blood pressure is 156/92 (abnormal) and her pulse is 77. Her respiration is 16 and oxygen saturation is 96%.       Patient is noted to have injection of the right conjunctiva without any dye uptake no foreign bodies appreciated the anterior and posterior chambers appear grossly within normal limits except there was some slight cells and flare suggestive of an iritis it is reported that the intraocular pressure came back at 21 the pupil was not mid and fixed      DIAGNOSTIC RESULTS       LABS:  No results found for this visit on 02/14/22. EMERGENCY DEPARTMENT COURSE:   Vitals:    Vitals:    02/14/22 1426   BP: (!) 156/92   Pulse: 77   Resp: 16   Temp: 98.4 °F (36.9 °C)   TempSrc: Oral   SpO2: 96%   Weight: 77.1 kg (170 lb)   Height: 5' 4\" (1.626 m)     -------------------------  BP: (!) 156/92, Temp: 98.4 °F (36.9 °C), Pulse: 77, Resp: 16      PERTINENT ATTENDING PHYSICIAN COMMENTS:    The patient presents with an iritis we are recommending that she stop her Cipro drops she was discussed with ophthalmology who are recommending that we dilate the eye and they will see the patient as an outpatient I do feel that is reasonable      (Please note that portions of this note were completed with a voice recognition program.  Efforts were made to edit the dictations but occasionally words are mis-transcribed.)    Rhina Powell MD,, MD, F.A.C.E.P.   Attending Emergency Medicine Physician       Rhina Powell MD  02/14/22 1542

## 2022-02-28 ENCOUNTER — TELEPHONE (OUTPATIENT)
Dept: PRIMARY CARE CLINIC | Age: 57
End: 2022-02-28

## 2022-02-28 DIAGNOSIS — F43.9 STRESS AT HOME: ICD-10-CM

## 2022-02-28 RX ORDER — CITALOPRAM 20 MG/1
TABLET ORAL
Qty: 90 TABLET | Refills: 0 | Status: SHIPPED | OUTPATIENT
Start: 2022-02-28 | End: 2022-05-31

## 2022-02-28 NOTE — Clinical Note
[address] [place] [address]  Phone: [phone]  Fax: [phone]    Trma Hutson MD        February 28, 2022    [address] [place] [address]      Dear ΣΑΡΑΝΤΙ:    ***    If you have any questions or concerns, please don't hesitate to call.     Sincerely,        Tram Hutson MD

## 2022-02-28 NOTE — LETTER
[address] [facility]  [address] [address]  Phone: [phone]  Fax: [phone]    Tram Hutson MD        February 28, 2022     Patient: Eryn Daniel   YOB: 1965   Date of Visit: 2/28/2022       To Whom It May Concern: It is my medical opinion that Lila Hope no longer is needing her oxygen condenser. Please coordinate a  with the patient. Thank you. If you have any questions or concerns, please don't hesitate to call.     Sincerely,        Tram Hutson MD

## 2022-02-28 NOTE — TELEPHONE ENCOUNTER
----- Message from Alba Yimagi sent at 2/28/2022  3:29 PM EST -----  Subject: Message to Provider    QUESTIONS  Information for Provider? pt calling as she needs a release sent to   [employer] to have the oxygen condenser picked up, will not  without   release information faxed to them, # [phone] Please call when release   has been faxed   ---------------------------------------------------------------------------  --------------  [address]  What is the best way for the office to contact you? OK to leave message on   voicemail  Preferred Call Back Phone Number?  [phone]  ---------------------------------------------------------------------------  --------------  SCRIPT ANSWERS  undefined

## 2022-05-30 DIAGNOSIS — F43.9 STRESS AT HOME: ICD-10-CM

## 2022-05-31 RX ORDER — CITALOPRAM 20 MG/1
TABLET ORAL
Qty: 90 TABLET | Refills: 0 | Status: SHIPPED | OUTPATIENT
Start: 2022-05-31 | End: 2022-09-06

## 2022-06-17 ENCOUNTER — TELEPHONE (OUTPATIENT)
Dept: PRIMARY CARE CLINIC | Age: 57
End: 2022-06-17

## 2022-06-17 NOTE — TELEPHONE ENCOUNTER
LVM for patient to call office back to reschedule appt for 7/28/22. Mona Merritt will be out of the office in the afternoon that day.

## 2022-09-02 DIAGNOSIS — F43.9 STRESS AT HOME: ICD-10-CM

## 2022-09-06 RX ORDER — CITALOPRAM 20 MG/1
TABLET ORAL
Qty: 90 TABLET | Refills: 0 | Status: SHIPPED | OUTPATIENT
Start: 2022-09-06

## 2022-11-08 ENCOUNTER — OFFICE VISIT (OUTPATIENT)
Dept: PRIMARY CARE CLINIC | Age: 57
End: 2022-11-08
Payer: MEDICARE

## 2022-11-08 ENCOUNTER — HOSPITAL ENCOUNTER (OUTPATIENT)
Age: 57
Setting detail: SPECIMEN
Discharge: HOME | End: 2022-11-08

## 2022-11-08 VITALS
DIASTOLIC BLOOD PRESSURE: 84 MMHG | OXYGEN SATURATION: 97 % | WEIGHT: 189.6 LBS | HEIGHT: 64 IN | SYSTOLIC BLOOD PRESSURE: 140 MMHG | HEART RATE: 67 BPM | BODY MASS INDEX: 32.37 KG/M2

## 2022-11-08 DIAGNOSIS — M25.559 HIP PAIN: ICD-10-CM

## 2022-11-08 DIAGNOSIS — I48.0 PAF (PAROXYSMAL ATRIAL FIBRILLATION) (HCC): Chronic | ICD-10-CM

## 2022-11-08 DIAGNOSIS — E83.42 HYPOMAGNESEMIA: ICD-10-CM

## 2022-11-08 DIAGNOSIS — I10 ESSENTIAL HYPERTENSION: Primary | Chronic | ICD-10-CM

## 2022-11-08 DIAGNOSIS — I10 ESSENTIAL HYPERTENSION: Chronic | ICD-10-CM

## 2022-11-08 DIAGNOSIS — F41.9 ANXIETY: ICD-10-CM

## 2022-11-08 LAB
ABSOLUTE EOS #: 0.12 K/UL (ref 0–0.44)
ABSOLUTE IMMATURE GRANULOCYTE: <0.03 K/UL (ref 0–0.3)
ABSOLUTE LYMPH #: 2.13 K/UL (ref 1.1–3.7)
ABSOLUTE MONO #: 0.48 K/UL (ref 0.1–1.2)
BASOPHILS # BLD: 1 % (ref 0–2)
BASOPHILS ABSOLUTE: 0.07 K/UL (ref 0–0.2)
EOSINOPHILS RELATIVE PERCENT: 2 % (ref 1–4)
HCT VFR BLD CALC: 38.5 % (ref 36.3–47.1)
HEMOGLOBIN: 12.8 G/DL (ref 11.9–15.1)
IMMATURE GRANULOCYTES: 0 %
LYMPHOCYTES # BLD: 34 % (ref 24–43)
MCH RBC QN AUTO: 28.7 PG (ref 25.2–33.5)
MCHC RBC AUTO-ENTMCNC: 33.2 G/DL (ref 28.4–34.8)
MCV RBC AUTO: 86.3 FL (ref 82.6–102.9)
MONOCYTES # BLD: 8 % (ref 3–12)
NRBC AUTOMATED: 0 PER 100 WBC
PDW BLD-RTO: 13.5 % (ref 11.8–14.4)
PLATELET # BLD: 258 K/UL (ref 138–453)
PMV BLD AUTO: 10.2 FL (ref 8.1–13.5)
RBC # BLD: 4.46 M/UL (ref 3.95–5.11)
SEG NEUTROPHILS: 55 % (ref 36–65)
SEGMENTED NEUTROPHILS ABSOLUTE COUNT: 3.44 K/UL (ref 1.5–8.1)
WBC # BLD: 6.3 K/UL (ref 3.5–11.3)

## 2022-11-08 PROCEDURE — 3017F COLORECTAL CA SCREEN DOC REV: CPT | Performed by: FAMILY MEDICINE

## 2022-11-08 PROCEDURE — G8484 FLU IMMUNIZE NO ADMIN: HCPCS | Performed by: FAMILY MEDICINE

## 2022-11-08 PROCEDURE — 3078F DIAST BP <80 MM HG: CPT | Performed by: FAMILY MEDICINE

## 2022-11-08 PROCEDURE — 3074F SYST BP LT 130 MM HG: CPT | Performed by: FAMILY MEDICINE

## 2022-11-08 PROCEDURE — G8427 DOCREV CUR MEDS BY ELIG CLIN: HCPCS | Performed by: FAMILY MEDICINE

## 2022-11-08 PROCEDURE — G8417 CALC BMI ABV UP PARAM F/U: HCPCS | Performed by: FAMILY MEDICINE

## 2022-11-08 PROCEDURE — 1036F TOBACCO NON-USER: CPT | Performed by: FAMILY MEDICINE

## 2022-11-08 PROCEDURE — 99214 OFFICE O/P EST MOD 30 MIN: CPT | Performed by: FAMILY MEDICINE

## 2022-11-08 RX ORDER — ESCITALOPRAM OXALATE 20 MG/1
20 TABLET ORAL DAILY
Qty: 30 TABLET | Refills: 3 | Status: SHIPPED | OUTPATIENT
Start: 2022-11-08

## 2022-11-08 RX ORDER — BUTALBITAL, ACETAMINOPHEN AND CAFFEINE 50; 325; 40 MG/1; MG/1; MG/1
1 TABLET ORAL EVERY 4 HOURS PRN
Qty: 36 TABLET | Refills: 2 | Status: SHIPPED | OUTPATIENT
Start: 2022-11-08

## 2022-11-08 RX ORDER — CITALOPRAM 20 MG/1
TABLET ORAL
Qty: 30 TABLET | Refills: 5 | Status: CANCELLED | OUTPATIENT
Start: 2022-11-08 | End: 2023-11-08

## 2022-11-08 SDOH — ECONOMIC STABILITY: FOOD INSECURITY: WITHIN THE PAST 12 MONTHS, YOU WORRIED THAT YOUR FOOD WOULD RUN OUT BEFORE YOU GOT MONEY TO BUY MORE.: NEVER TRUE

## 2022-11-08 SDOH — ECONOMIC STABILITY: FOOD INSECURITY: WITHIN THE PAST 12 MONTHS, THE FOOD YOU BOUGHT JUST DIDN'T LAST AND YOU DIDN'T HAVE MONEY TO GET MORE.: NEVER TRUE

## 2022-11-08 ASSESSMENT — PATIENT HEALTH QUESTIONNAIRE - PHQ9
7. TROUBLE CONCENTRATING ON THINGS, SUCH AS READING THE NEWSPAPER OR WATCHING TELEVISION: 0
8. MOVING OR SPEAKING SO SLOWLY THAT OTHER PEOPLE COULD HAVE NOTICED. OR THE OPPOSITE, BEING SO FIGETY OR RESTLESS THAT YOU HAVE BEEN MOVING AROUND A LOT MORE THAN USUAL: 0
2. FEELING DOWN, DEPRESSED OR HOPELESS: 0
10. IF YOU CHECKED OFF ANY PROBLEMS, HOW DIFFICULT HAVE THESE PROBLEMS MADE IT FOR YOU TO DO YOUR WORK, TAKE CARE OF THINGS AT HOME, OR GET ALONG WITH OTHER PEOPLE: 0
SUM OF ALL RESPONSES TO PHQ QUESTIONS 1-9: 0
SUM OF ALL RESPONSES TO PHQ QUESTIONS 1-9: 0
SUM OF ALL RESPONSES TO PHQ9 QUESTIONS 1 & 2: 0
SUM OF ALL RESPONSES TO PHQ QUESTIONS 1-9: 0
3. TROUBLE FALLING OR STAYING ASLEEP: 0
SUM OF ALL RESPONSES TO PHQ QUESTIONS 1-9: 0
9. THOUGHTS THAT YOU WOULD BE BETTER OFF DEAD, OR OF HURTING YOURSELF: 0
5. POOR APPETITE OR OVEREATING: 0
4. FEELING TIRED OR HAVING LITTLE ENERGY: 0
1. LITTLE INTEREST OR PLEASURE IN DOING THINGS: 0
6. FEELING BAD ABOUT YOURSELF - OR THAT YOU ARE A FAILURE OR HAVE LET YOURSELF OR YOUR FAMILY DOWN: 0

## 2022-11-08 ASSESSMENT — SOCIAL DETERMINANTS OF HEALTH (SDOH): HOW HARD IS IT FOR YOU TO PAY FOR THE VERY BASICS LIKE FOOD, HOUSING, MEDICAL CARE, AND HEATING?: NOT HARD AT ALL

## 2022-11-08 ASSESSMENT — ENCOUNTER SYMPTOMS
BACK PAIN: 0
DIARRHEA: 0
SHORTNESS OF BREATH: 0
ABDOMINAL PAIN: 0
CHEST TIGHTNESS: 0
COUGH: 0
SORE THROAT: 0
CONSTIPATION: 0

## 2022-11-08 NOTE — PROGRESS NOTES
Subjective:     Patient ID: Moss Oppenheim is a 56 y.o. female    HPI  Tru Calderon returns today for follow-up on her hypertension and her anxiety. Overall she feels that she has made quite a recovery over the past year. She denies any chest pain she does have some palpitations however and follows with cardiology. Her anxiety is off-and-on. She has some concerns about the upcoming anniversary of her COVID and her death experience. Also had her son get  recently which she is happy about that causes her some anxiety. Feels like she aged over the past year since [place]  more prone to panic attacks  Cardiloite stress test Dr Robert Menjivar    palpitations daily   on metoprolol and Xarelto for A. fib  Some diarrhea with magnesium  C/o hip pain and stiffness  Review of Systems   Constitutional:  Negative for activity change, appetite change, fatigue and fever. HENT:  Negative for sore throat. Eyes:  Negative for visual disturbance. Respiratory:  Negative for cough, chest tightness and shortness of breath. Cardiovascular:  Positive for palpitations. Negative for chest pain and leg swelling. Gastrointestinal:  Negative for abdominal pain, constipation and diarrhea. Endocrine: Negative for cold intolerance. Genitourinary:  Negative for dysuria and urgency. Musculoskeletal:  Negative for back pain. Neurological:  Negative for dizziness, syncope and headaches. Hematological:  Does not bruise/bleed easily. Psychiatric/Behavioral:  Negative for confusion and sleep disturbance. The patient is nervous/anxious. Objective:     Physical Exam  Vitals and nursing note reviewed. Constitutional:       Appearance: Normal appearance. She is obese. HENT:      Head: Normocephalic. Right Ear: External ear normal.      Left Ear: External ear normal.      Nose: Nose normal.      Mouth/Throat:      Mouth: Mucous membranes are moist.      Pharynx: Oropharynx is clear.    Eyes:      Conjunctiva/sclera: Conjunctivae normal.      Pupils: Pupils are equal, round, and reactive to light. Cardiovascular:      Rate and Rhythm: Normal rate and regular rhythm. Pulses: Normal pulses. Heart sounds: Normal heart sounds. No murmur heard. Pulmonary:      Effort: Pulmonary effort is normal.      Breath sounds: Normal breath sounds. No wheezing. Musculoskeletal:         General: Tenderness present. Normal range of motion. Cervical back: Neck supple. Right lower leg: No edema. Left lower leg: No edema. Lymphadenopathy:      Cervical: No cervical adenopathy. Skin:     General: Skin is warm and dry. Capillary Refill: Capillary refill takes less than 2 seconds. Neurological:      General: No focal deficit present. Mental Status: She is alert and oriented to person, place, and time. Psychiatric:         Mood and Affect: Mood normal.         Behavior: Behavior normal.     Initial blood pressure was elevated but was 140/84 during the visit. Reviewed her labs from [place] [address] which include a magnesium of 1.0        Assessment/Plan:     1. Essential hypertension    2. Hypomagnesemia    3. Anxiety    4. PAF (paroxysmal atrial fibrillation) ([facility] [address]    5. Hip pain          Ramila was seen today for hypertension and anxiety. Diagnoses and all orders for this visit:    Essential hypertension  Fair control with metoprolol avoid added salt and caffeine  Hypomagnesemia  Will increase her magnesium replacement therapy. Also recheck her labs  Anxiety  Like to try something different than Celexa. She is afraid to try sertraline. We will try escitalopram Lexapro 20 mg daily. PAF (paroxysmal atrial fibrillation) ([address]  Still having palpitations follows with cardiology considered verapamil Cardizem etc.  Hip pain  Could not tolerate PT  May take Tylenol Extra Strength up to 3 g a day.     Sancho Talavera MD    Please note that this chart was generated using voice recognition Dragon dictation software. Although every effort was made to ensure the accuracy of this automated transcription, some errors in transcription may have occurred.

## 2022-11-08 NOTE — PATIENT INSTRUCTIONS
Increase Tylenol to 3 gms daily as needed for hip pain    May use Magnesium Glycinate or Mag Malate  2-3 times daily    less chance of diarrhea     Change from celexa to lexapro tonite

## 2022-11-09 LAB — MAGNESIUM: 1.6 MG/DL (ref 1.6–2.6)

## 2023-02-01 ENCOUNTER — COMMUNITY OUTREACH (OUTPATIENT)
Dept: PRIMARY CARE CLINIC | Age: 58
End: 2023-02-01

## 2023-02-01 NOTE — PROGRESS NOTES
Patient's HM shows they are overdue for Mammogram Screening. [employer] and  files searched with success. Results attached to order and HM updated. Patient is current for Colorectal Cancer Screen.

## 2023-02-09 ENCOUNTER — TELEPHONE (OUTPATIENT)
Dept: PRIMARY CARE CLINIC | Age: 58
End: 2023-02-09

## 2023-02-09 NOTE — TELEPHONE ENCOUNTER
----- Message from Pa Rasmussen sent at 2/7/2023  5:00 PM EST -----  Subject: Appointment Request    Reason for Call: Established Patient Appointment needed: Routine Existing   Condition Follow Up    QUESTIONS    Reason for appointment request? Requested Provider unavailable - [facility]     Additional Information for Provider? Pt would like to schedule 3 month fu   with PCP listed above. No preferred days but preferred an afternoon time. Please contact ASAP to get appt scheduled.   ---------------------------------------------------------------------------  --------------  Jemal ROSS  [phone]; OK to leave message on voicemail  ---------------------------------------------------------------------------  --------------  SCRIPT ANSWERS  COVID Screen: Bautista Mclaughlin

## 2023-03-02 ENCOUNTER — OFFICE VISIT (OUTPATIENT)
Dept: PRIMARY CARE CLINIC | Age: 58
End: 2023-03-02
Payer: MEDICAID

## 2023-03-02 ENCOUNTER — HOSPITAL ENCOUNTER (OUTPATIENT)
Age: 58
Discharge: HOME | End: 2023-03-02
Payer: MEDICAID

## 2023-03-02 VITALS
OXYGEN SATURATION: 99 % | WEIGHT: 201.4 LBS | BODY MASS INDEX: 34.38 KG/M2 | SYSTOLIC BLOOD PRESSURE: 134 MMHG | HEIGHT: 64 IN | HEART RATE: 69 BPM | RESPIRATION RATE: 18 BRPM | DIASTOLIC BLOOD PRESSURE: 84 MMHG

## 2023-03-02 DIAGNOSIS — F41.9 ANXIETY: ICD-10-CM

## 2023-03-02 DIAGNOSIS — I48.0 PAF (PAROXYSMAL ATRIAL FIBRILLATION) (HCC): Chronic | ICD-10-CM

## 2023-03-02 DIAGNOSIS — E61.2 MAGNESIUM DEFICIENCY: ICD-10-CM

## 2023-03-02 DIAGNOSIS — M25.551 CHRONIC HIP PAIN, BILATERAL: ICD-10-CM

## 2023-03-02 DIAGNOSIS — G43.009 MIGRAINE WITHOUT AURA AND WITHOUT STATUS MIGRAINOSUS, NOT INTRACTABLE: Chronic | ICD-10-CM

## 2023-03-02 DIAGNOSIS — K21.9 GASTROESOPHAGEAL REFLUX DISEASE WITHOUT ESOPHAGITIS: Primary | ICD-10-CM

## 2023-03-02 DIAGNOSIS — I44.7 LEFT BUNDLE BRANCH BLOCK: ICD-10-CM

## 2023-03-02 DIAGNOSIS — G89.29 CHRONIC HIP PAIN, BILATERAL: ICD-10-CM

## 2023-03-02 DIAGNOSIS — M25.552 CHRONIC HIP PAIN, BILATERAL: ICD-10-CM

## 2023-03-02 PROBLEM — F43.9 STRESS AT HOME: Status: RESOLVED | Noted: 2017-02-01 | Resolved: 2023-03-02

## 2023-03-02 PROBLEM — J12.82 PNEUMONIA DUE TO COVID-19 VIRUS: Status: RESOLVED | Noted: 2021-12-15 | Resolved: 2023-03-02

## 2023-03-02 PROBLEM — I10 ESSENTIAL HYPERTENSION: Chronic | Status: RESOLVED | Noted: 2019-10-02 | Resolved: 2023-03-02

## 2023-03-02 PROBLEM — U07.1 PNEUMONIA DUE TO COVID-19 VIRUS: Status: RESOLVED | Noted: 2021-12-15 | Resolved: 2023-03-02

## 2023-03-02 LAB
ALBUMIN SERPL-MCNC: 4.1 G/DL (ref 3.5–5.2)
ALP SERPL-CCNC: 67 U/L (ref 35–104)
ALT SERPL-CCNC: 12 U/L (ref 5–33)
AST SERPL-CCNC: 22 U/L
CHOLEST SERPL-MCNC: 238 MG/DL
CHOLESTEROL/HDL RATIO: 6.8
HDLC SERPL-MCNC: 35 MG/DL
LDLC SERPL CALC-MCNC: 148 MG/DL (ref 0–130)
TRIGL SERPL-MCNC: 274 MG/DL

## 2023-03-02 PROCEDURE — 84439 ASSAY OF FREE THYROXINE: CPT

## 2023-03-02 PROCEDURE — 86376 MICROSOMAL ANTIBODY EACH: CPT

## 2023-03-02 PROCEDURE — 80061 LIPID PANEL: CPT

## 2023-03-02 PROCEDURE — 84460 ALANINE AMINO (ALT) (SGPT): CPT

## 2023-03-02 PROCEDURE — 84443 ASSAY THYROID STIM HORMONE: CPT

## 2023-03-02 PROCEDURE — 82040 ASSAY OF SERUM ALBUMIN: CPT

## 2023-03-02 PROCEDURE — 36415 COLL VENOUS BLD VENIPUNCTURE: CPT

## 2023-03-02 PROCEDURE — 84450 TRANSFERASE (AST) (SGOT): CPT

## 2023-03-02 PROCEDURE — 86800 THYROGLOBULIN ANTIBODY: CPT

## 2023-03-02 PROCEDURE — 84075 ASSAY ALKALINE PHOSPHATASE: CPT

## 2023-03-02 PROCEDURE — 99214 OFFICE O/P EST MOD 30 MIN: CPT | Performed by: NURSE PRACTITIONER

## 2023-03-02 RX ORDER — PANTOPRAZOLE SODIUM 40 MG/1
40 TABLET, DELAYED RELEASE ORAL NIGHTLY
Qty: 90 TABLET | Refills: 1 | Status: SHIPPED | OUTPATIENT
Start: 2023-03-02

## 2023-03-02 RX ORDER — HYDROXYZINE HYDROCHLORIDE 25 MG/1
25 TABLET, FILM COATED ORAL NIGHTLY PRN
Qty: 30 TABLET | Refills: 0 | Status: SHIPPED | OUTPATIENT
Start: 2023-03-02 | End: 2023-04-01

## 2023-03-02 RX ORDER — ESCITALOPRAM OXALATE 20 MG/1
20 TABLET ORAL DAILY
Qty: 90 TABLET | Refills: 1 | Status: SHIPPED | OUTPATIENT
Start: 2023-03-02

## 2023-03-02 SDOH — ECONOMIC STABILITY: FOOD INSECURITY: WITHIN THE PAST 12 MONTHS, YOU WORRIED THAT YOUR FOOD WOULD RUN OUT BEFORE YOU GOT MONEY TO BUY MORE.: NEVER TRUE

## 2023-03-02 SDOH — HEALTH STABILITY: PHYSICAL HEALTH: ON AVERAGE, HOW MANY DAYS PER WEEK DO YOU ENGAGE IN MODERATE TO STRENUOUS EXERCISE (LIKE A BRISK WALK)?: 1 DAY

## 2023-03-02 SDOH — ECONOMIC STABILITY: HOUSING INSECURITY
IN THE LAST 12 MONTHS, WAS THERE A TIME WHEN YOU DID NOT HAVE A STEADY PLACE TO SLEEP OR SLEPT IN A SHELTER (INCLUDING NOW)?: NO

## 2023-03-02 SDOH — ECONOMIC STABILITY: FOOD INSECURITY: WITHIN THE PAST 12 MONTHS, THE FOOD YOU BOUGHT JUST DIDN'T LAST AND YOU DIDN'T HAVE MONEY TO GET MORE.: NEVER TRUE

## 2023-03-02 SDOH — ECONOMIC STABILITY: INCOME INSECURITY: HOW HARD IS IT FOR YOU TO PAY FOR THE VERY BASICS LIKE FOOD, HOUSING, MEDICAL CARE, AND HEATING?: NOT HARD AT ALL

## 2023-03-02 ASSESSMENT — ENCOUNTER SYMPTOMS
DIARRHEA: 0
SORE THROAT: 0
SHORTNESS OF BREATH: 0
COLOR CHANGE: 0
CHEST TIGHTNESS: 0
ABDOMINAL PAIN: 0
RHINORRHEA: 0
VOMITING: 0
NAUSEA: 0

## 2023-03-02 ASSESSMENT — PATIENT HEALTH QUESTIONNAIRE - PHQ9
9. THOUGHTS THAT YOU WOULD BE BETTER OFF DEAD, OR OF HURTING YOURSELF: 0
1. LITTLE INTEREST OR PLEASURE IN DOING THINGS: 0
7. TROUBLE CONCENTRATING ON THINGS, SUCH AS READING THE NEWSPAPER OR WATCHING TELEVISION: 2
2. FEELING DOWN, DEPRESSED OR HOPELESS: 0
6. FEELING BAD ABOUT YOURSELF - OR THAT YOU ARE A FAILURE OR HAVE LET YOURSELF OR YOUR FAMILY DOWN: 0
SUM OF ALL RESPONSES TO PHQ QUESTIONS 1-9: 8
3. TROUBLE FALLING OR STAYING ASLEEP: 3
SUM OF ALL RESPONSES TO PHQ QUESTIONS 1-9: 8
4. FEELING TIRED OR HAVING LITTLE ENERGY: 3
SUM OF ALL RESPONSES TO PHQ9 QUESTIONS 1 & 2: 0
SUM OF ALL RESPONSES TO PHQ QUESTIONS 1-9: 8
5. POOR APPETITE OR OVEREATING: 0
8. MOVING OR SPEAKING SO SLOWLY THAT OTHER PEOPLE COULD HAVE NOTICED. OR THE OPPOSITE, BEING SO FIGETY OR RESTLESS THAT YOU HAVE BEEN MOVING AROUND A LOT MORE THAN USUAL: 0
10. IF YOU CHECKED OFF ANY PROBLEMS, HOW DIFFICULT HAVE THESE PROBLEMS MADE IT FOR YOU TO DO YOUR WORK, TAKE CARE OF THINGS AT HOME, OR GET ALONG WITH OTHER PEOPLE: 1
SUM OF ALL RESPONSES TO PHQ QUESTIONS 1-9: 8

## 2023-03-02 ASSESSMENT — SOCIAL DETERMINANTS OF HEALTH (SDOH)
WITHIN THE LAST YEAR, HAVE YOU BEEN KICKED, HIT, SLAPPED, OR OTHERWISE PHYSICALLY HURT BY YOUR PARTNER OR EX-PARTNER?: NO
WITHIN THE LAST YEAR, HAVE YOU BEEN AFRAID OF YOUR PARTNER OR EX-PARTNER?: NO
WITHIN THE LAST YEAR, HAVE TO BEEN RAPED OR FORCED TO HAVE ANY KIND OF SEXUAL ACTIVITY BY YOUR PARTNER OR EX-PARTNER?: NO

## 2023-03-02 NOTE — PROGRESS NOTES
[address] PRIMARY CARE  [address] [facility] [address]  [address] [address]  Dept: [phone]  Dept Fax: [phone]    Suly Figueroa is a 56 y.o. female who presentstoday for her medical conditions/complaints as noted below.   Suly Figueroa is c/o of  Chief Complaint   Patient presents with    New Patient     Establish Care, Discuss Medications    Health Maintenance     Declined Mammogram         HPI:     Here to est with new provider  Formerly followed with Dr. Poonam Vogt    [facility] significant for anxiety- stable on lexapro  Had significant illness and hospitalization so does have some anxiety triggers with sounds but these are manageable   Is interested in trialing PRN medication for severe anxiety episodes     HA stable with fioricet PRN     Follows with cardiology for chronic a fib, L BBB, rate controlled with metoprolol 50mg   On xarelto, denies abnormal bruising or bleeding     GERD stable with PPI    On magnesium supplement for hypomagnesemia- stable     C/o chronic bilateral hip pain, would like to update imaging   Denies back pain or bowel or bladder dysfunction   No hx of injury       No results found for: LABA1C          ( goal A1C is < 7)   No results found for: LABMICR  LDL Cholesterol (mg/dL)   Date Value   2023 148 (H)   2019 127   2018            (goal LDL is <100)   AST (U/L)   Date Value   2023 22     ALT (U/L)   Date Value   2023 12     BUN (mg/dL)   Date Value   2022 8     BP Readings from Last 3 Encounters:   23 134/84   22 (!) 140/84   22 (!) 156/92          ([id number])    Past Medical History:   Diagnosis Date    Anxiety     Atrial fibrillation (HCC)     GERD (gastroesophageal reflux disease)     Kidney stones     Migraine     Palpitations     Pneumonia     Pneumonia due to COVID-19 virus 12/15/2021    Septic shock ([facility] [address]     Sinus tachycardia       Past Surgical History:   Procedure Laterality Date APPENDECTOMY       SECTION      HYSTERECTOMY (CERVIX STATUS UNKNOWN)      HYSTERECTOMY, TOTAL ABDOMINAL (CERVIX REMOVED)      HYSTERECTOMY, VAGINAL         Family History   Problem Relation Age of Onset    High Blood Pressure Mother     Other Mother         diverticulitis    Arthritis Mother     Arrhythmia Mother     Arthritis Father         Rheumatoid    Diabetes Father     Kidney Disease Father     Cancer Father         liver    Stroke Father        Social History     Tobacco Use    Smoking status: Never    Smokeless tobacco: Never   Substance Use Topics    Alcohol use: No      Current Outpatient Medications   Medication Sig Dispense Refill    pantoprazole (PROTONIX) 40 MG tablet Take 1 tablet by mouth nightly 90 tablet 1    Magnesium 400 MG CAPS Take 800 mg by mouth in the morning and at bedtime 120 capsule 3    hydrOXYzine HCl (ATARAX) 25 MG tablet Take 1 tablet by mouth nightly as needed for Anxiety 30 tablet 0    escitalopram (LEXAPRO) 20 MG tablet Take 1 tablet by mouth daily 90 tablet 1    butalbital-acetaminophen-caffeine (FIORICET, ESGIC) -40 MG per tablet Take 1 tablet by mouth every 4 hours as needed for Headaches 36 tablet 2    metoprolol tartrate (LOPRESSOR) 50 MG tablet take 1 tablet by mouth twice a day      XARELTO 20 MG TABS tablet take 1 tablet by mouth once daily      Multiple Vitamins-Minerals (THERAPEUTIC MULTIVITAMIN-MINERALS) tablet Take 1 tablet by mouth daily       No current facility-administered medications for this visit.      Allergies   Allergen Reactions    Diltiazem Other (See Comments)     Heartburn, weight gain     Erythromycin      Pt reports light headedness, dizziness and LOC     Other Other (See Comments)     States reacts easily to medication        Health Maintenance   Topic Date Due    COVID-19 Vaccine (1) Never done    DTaP/Tdap/Td vaccine (1 - Tdap) Never done    Shingles vaccine (1 of 2) Never done    Breast cancer screen  2022    Flu vaccine (1) Never done    Depression Screen  11/08/2023    Colorectal Cancer Screen  03/17/2024    Lipids  03/02/2028    Hepatitis A vaccine  Aged Out    Hib vaccine  Aged Out    Meningococcal (ACWY) vaccine  Aged Out    Pneumococcal 0-64 years Vaccine  Aged Out    Hepatitis C screen  Discontinued    HIV screen  Discontinued       Subjective:      Review of Systems   Constitutional:  Negative for activity change, fatigue and fever. HENT:  Negative for congestion, rhinorrhea and sore throat. Eyes:  Negative for visual disturbance. Respiratory:  Negative for chest tightness and shortness of breath. Cardiovascular:  Negative for chest pain and palpitations. Gastrointestinal:  Negative for abdominal pain, diarrhea, nausea and vomiting. Endocrine: Negative for polydipsia. Genitourinary:  Negative for difficulty urinating. Musculoskeletal:  Negative for arthralgias and myalgias. Skin:  Negative for color change. Neurological:  Negative for weakness and headaches. Psychiatric/Behavioral:  Negative for behavioral problems. The patient is nervous/anxious. All other systems reviewed and are negative. Objective:   /84   Pulse 69   Resp 18   Ht 5' 4\" (1.626 m)   Wt 201 lb 6.4 oz (91.4 kg)   SpO2 99%   BMI 34.57 kg/m²   Physical Exam  Vitals reviewed. Constitutional:       General: She is not in acute distress. Appearance: Normal appearance. HENT:      Head: Normocephalic. Eyes:      Pupils: Pupils are equal, round, and reactive to light. Cardiovascular:      Rate and Rhythm: Normal rate and regular rhythm. Pulses: Normal pulses. Heart sounds: Normal heart sounds. Pulmonary:      Effort: Pulmonary effort is normal.      Breath sounds: Normal breath sounds. Abdominal:      General: There is no distension. Musculoskeletal:         General: Normal range of motion. Cervical back: Neck supple. Right lower leg: No edema. Left lower leg: No edema. Lymphadenopathy:      Cervical: No cervical adenopathy. Skin:     General: Skin is warm and dry. Capillary Refill: Capillary refill takes less than 2 seconds. Neurological:      General: No focal deficit present. Mental Status: She is alert and oriented to person, place, and time. Psychiatric:         Mood and Affect: Mood normal.         Behavior: Behavior normal.         :       Diagnosis Orders   1. Gastroesophageal reflux disease without esophagitis  pantoprazole (PROTONIX) 40 MG tablet      2. Magnesium deficiency  Magnesium 400 MG CAPS      3. Anxiety  hydrOXYzine HCl (ATARAX) 25 MG tablet      4. Chronic hip pain, bilateral  XR HIP BILATERAL W AP PELVIS (2 VIEWS)      5. PAF (paroxysmal atrial fibrillation) ([facility] [address]        6. Left bundle branch block        7. Migraine without aura and without status migrainosus, not intractable                  :          1. Gastroesophageal reflux disease without esophagitis  Stable with daily PPI  - pantoprazole (PROTONIX) 40 MG tablet; Take 1 tablet by mouth nightly  Dispense: 90 tablet; Refill: 1    2. Magnesium deficiency  Stable with daily supplement, recent labs WNL   - Magnesium 400 MG CAPS; Take 800 mg by mouth in the morning and at bedtime  Dispense: 120 capsule; Refill: 3    3. Anxiety  Grossly stable with lexapro, add hydroxyzine for PRN use   - hydrOXYzine HCl (ATARAX) 25 MG tablet; Take 1 tablet by mouth nightly as needed for Anxiety  Dispense: 30 tablet; Refill: 0    4. Chronic hip pain, bilateral  Waxing and waning, continue otc tylenol PRN, check xrays. Unable to use NSAIDs, may consider ortho f/u for injections if needed   - XR HIP BILATERAL W AP PELVIS (2 VIEWS); Future    5. PAF (paroxysmal atrial fibrillation) ([facility] [address]  6. Left bundle branch block  Stable, following with cardiology     7.  Migraine without aura and without status migrainosus, not intractable  Stable with fioricet PRN    Return in about 6 weeks (around 4/13/2023) for arthralgias, anxiety . Patient given educational materials - see patient instructions. Discussed use, benefit, and side effects of prescribed medications. All patient questions answered. Pt voiced understanding. Reviewed health maintenance. Instructed to continue current medications, diet and exercise. Patient agreed with treatment plan. Follow up as directed.        Electronicallysigned by CRISTIAN Clemens CNP on 3/2/2023 at 6:41 PM

## 2023-03-04 LAB
T4 FREE SERPL-MCNC: 0.97 NG/DL (ref 0.93–1.7)
THYROGLOBULIN AB: <12 IU/ML (ref 0–40)
THYROPEROXIDASE AB SERPL IA-ACNC: <4 IU/ML (ref 0–25)
TSH SERPL-ACNC: 2.38 UIU/ML (ref 0.3–5)

## 2023-03-18 ENCOUNTER — PATIENT MESSAGE (OUTPATIENT)
Dept: PRIMARY CARE CLINIC | Age: 58
End: 2023-03-18

## 2023-03-18 DIAGNOSIS — F41.9 ANXIETY: Primary | ICD-10-CM

## 2023-03-18 DIAGNOSIS — J30.2 SEASONAL ALLERGIES: ICD-10-CM

## 2023-03-20 RX ORDER — BUSPIRONE HYDROCHLORIDE 10 MG/1
10 TABLET ORAL 2 TIMES DAILY
Qty: 60 TABLET | Refills: 0 | Status: SHIPPED | OUTPATIENT
Start: 2023-03-20 | End: 2023-04-19

## 2023-03-20 RX ORDER — LORATADINE 10 MG/1
10 TABLET ORAL DAILY
Qty: 30 TABLET | Refills: 3 | Status: SHIPPED | OUTPATIENT
Start: 2023-03-20

## 2023-03-20 NOTE — TELEPHONE ENCOUNTER
From: Devin Mcdonough  To: Jonny Galloway  Sent: 3/18/2023 12:43 AM EDT  Subject: Ref : Prescription for hydroxyzine 25 may be too much for Bucky     The hydroxyzine does work but takes a long time too before it works if at all. When it does work the affect is extremely long lasting, it makes me want to do is sleep all day. I also wanted too touch base with you on getting an allergy med that will help me that does not have anything that will with my AFib. My allergies this year are off the scale.    Vince Sosa

## 2023-04-11 DIAGNOSIS — F41.9 ANXIETY: ICD-10-CM

## 2023-04-11 RX ORDER — BUSPIRONE HYDROCHLORIDE 10 MG/1
TABLET ORAL
Qty: 60 TABLET | Refills: 1 | Status: SHIPPED | OUTPATIENT
Start: 2023-04-11 | End: 2023-04-18

## 2023-04-18 ENCOUNTER — OFFICE VISIT (OUTPATIENT)
Dept: PRIMARY CARE CLINIC | Age: 58
End: 2023-04-18
Payer: MEDICAID

## 2023-04-18 VITALS
BODY MASS INDEX: 34.88 KG/M2 | RESPIRATION RATE: 16 BRPM | SYSTOLIC BLOOD PRESSURE: 136 MMHG | OXYGEN SATURATION: 99 % | DIASTOLIC BLOOD PRESSURE: 84 MMHG | WEIGHT: 203.2 LBS | HEART RATE: 66 BPM

## 2023-04-18 DIAGNOSIS — F41.9 ANXIETY: Primary | ICD-10-CM

## 2023-04-18 DIAGNOSIS — M25.552 BILATERAL HIP PAIN: ICD-10-CM

## 2023-04-18 DIAGNOSIS — M25.551 BILATERAL HIP PAIN: ICD-10-CM

## 2023-04-18 PROCEDURE — 99214 OFFICE O/P EST MOD 30 MIN: CPT | Performed by: NURSE PRACTITIONER

## 2023-04-18 RX ORDER — BUSPIRONE HYDROCHLORIDE 10 MG/1
10 TABLET ORAL 2 TIMES DAILY
Qty: 90 TABLET | Refills: 2 | Status: SHIPPED | OUTPATIENT
Start: 2023-04-18 | End: 2023-05-18

## 2023-04-18 ASSESSMENT — ENCOUNTER SYMPTOMS
SHORTNESS OF BREATH: 0
DIARRHEA: 0
CHEST TIGHTNESS: 0
RHINORRHEA: 0
SORE THROAT: 0
ABDOMINAL PAIN: 0
COLOR CHANGE: 0
VOMITING: 0
NAUSEA: 0

## 2023-04-18 NOTE — PROGRESS NOTES
Neurological:      General: No focal deficit present. Mental Status: She is alert and oriented to person, place, and time. Psychiatric:         Mood and Affect: Mood normal.         Behavior: Behavior normal.         :       Diagnosis Orders   1. Anxiety  busPIRone (BUSPAR) 10 MG tablet      2. Bilateral hip pain                  :          1. Anxiety  Waxing and waning, continue lexapro 20mg daily and trial buspar 10mg BID. Recheck in 6 months or sooner if needed     - busPIRone (BUSPAR) 10 MG tablet; Take 1 tablet by mouth 2 times daily Take 1 tab in morning and 2 tabs in evening  Dispense: 90 tablet; Refill: 2    2. Bilateral hip pain  Waxing and waning, continue tylenol PRN and topicals and supportive care. Ortho f/u as needed. Reviewed xray and discussed. Return in about 6 months (around 10/18/2023) for Depression/Anxiety. Patient given educational materials - see patient instructions. Discussed use, benefit, and side effects of prescribed medications. All patient questions answered. Pt voiced understanding. Reviewed health maintenance. Instructed to continue current medications, diet and exercise. Patient agreed with treatment plan. Follow up as directed.        Electronicallysigned by CRISTIAN Cook CNP on 4/21/2023 at 2:59 PM

## 2023-05-11 ENCOUNTER — PATIENT MESSAGE (OUTPATIENT)
Dept: PRIMARY CARE CLINIC | Age: 58
End: 2023-05-11

## 2023-05-11 DIAGNOSIS — F41.9 ANXIETY: ICD-10-CM

## 2023-05-12 RX ORDER — BUSPIRONE HYDROCHLORIDE 10 MG/1
TABLET ORAL
Qty: 90 TABLET | Refills: 2 | Status: SHIPPED | OUTPATIENT
Start: 2023-05-12

## 2023-05-12 NOTE — TELEPHONE ENCOUNTER
From: Lazaro Roman  To: Zeke Ruffin  Sent: 5/11/2023 4:41 PM EDT  Subject: Buspirone prescription     I received my prescription for buspirone from [email or web address], but it's not for the amount you prescribed. I only received enough buspirone for 30 days at 2 times a day. If I remember right you were going to write it for 3 a day , once in the morning and 2 at night. Please let me.k ow if I misunderstood.

## 2023-05-29 ENCOUNTER — HOSPITAL ENCOUNTER (EMERGENCY)
Age: 58
Discharge: HOME | End: 2023-05-29
Attending: EMERGENCY MEDICINE
Payer: MEDICAID

## 2023-05-29 VITALS
DIASTOLIC BLOOD PRESSURE: 76 MMHG | RESPIRATION RATE: 18 BRPM | OXYGEN SATURATION: 98 % | BODY MASS INDEX: 30.73 KG/M2 | TEMPERATURE: 97.6 F | SYSTOLIC BLOOD PRESSURE: 126 MMHG | HEART RATE: 76 BPM | HEIGHT: 64 IN | WEIGHT: 180 LBS

## 2023-05-29 DIAGNOSIS — M76.31 ILIOTIBIAL BAND SYNDROME OF RIGHT SIDE: ICD-10-CM

## 2023-05-29 DIAGNOSIS — M70.71 BURSITIS OF RIGHT HIP, UNSPECIFIED BURSA: Primary | ICD-10-CM

## 2023-05-29 PROCEDURE — 99283 EMERGENCY DEPT VISIT LOW MDM: CPT

## 2023-05-29 RX ORDER — METHOCARBAMOL 500 MG/1
500 TABLET, FILM COATED ORAL EVERY 8 HOURS PRN
Qty: 15 TABLET | Refills: 0 | Status: SHIPPED | OUTPATIENT
Start: 2023-05-29 | End: 2023-06-03

## 2023-05-29 RX ORDER — PREDNISONE 20 MG/1
60 TABLET ORAL DAILY
Qty: 15 TABLET | Refills: 0 | Status: SHIPPED | OUTPATIENT
Start: 2023-05-29 | End: 2023-06-03

## 2023-05-29 ASSESSMENT — PAIN - FUNCTIONAL ASSESSMENT: PAIN_FUNCTIONAL_ASSESSMENT: 0-10

## 2023-05-29 ASSESSMENT — PAIN SCALES - GENERAL: PAINLEVEL_OUTOF10: 5

## 2023-05-29 NOTE — DISCHARGE INSTRUCTIONS
Please follow up with your PCP in 1-2 days. Meds as directed. Return to the ER for weakness, numbness, tingling, or any other worsening symptoms or concerns. Use an ice pack or bag filled with ice and apply to the injured area 3 - 4 times a day for 15 - 20 minutes each time. If the injury is older than 3 days, then use a heating pad to help relax the muscles in your back. Rest.  No heavy lifting. No driving or working while taking any narcotic pain medications as it may make you sleepy    Please return to the ED for increased pain, numbness, tingling, weakness, bladder or bowel dysfunction or other concerns deemed emergent.

## 2023-05-29 NOTE — ED PROVIDER NOTES
[address]      Pt Name: Martha Herrera  MRN: [id number]  [place] 1965  Date of evaluation: 5/29/2023  Provider: CRISTIAN Pizarro [id number]       Chief Complaint   Patient presents with    Leg Pain     Patient comes in states right sided leg pain for last few days that has progressively gotten worse until today. Patient states pain worse with walking and that pain radiates from right hip down entire right leg. HISTORY OF PRESENT ILLNESS   (Location/Symptom, Timing/Onset, Context/Setting, Quality, Duration, Modifying Factors, Severity)  Note limiting factors. Martha Herrera is a 56 y.o. female who presents to the emergency department for evaluation of intermittent right leg pain that has become worse over the past few days. Patient does admit that she has been increasing her ambulation over the past few days. She states that she has a history of pain to the right hip and buttocks area that started as far back as 2021 after she was admitted to the ICU and prolonged ventilator after COVID. She states she came home 2021 and just developed the pain in her right hip/buttocks. It eventually dissipated. Over the past month it has become intermittently worse and seems to be localized to the buttocks, hip, groin and then radiates down the side and back of the right thigh and then into the shin and lateral side of the right calf. It stops at the ankle and does not affect the foot. She has no numbness no tingling no weakness just significant pain that is worse after use and then prolonged sitting. It is also worse when she tries to get in and out of the car. She has no low back pain. No falls no injury. 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 No abdominal pain no urinary symptoms. Nursing Notes were reviewed.     REVIEW OF SYSTEMS    (2-9 systems for level 4, 10 or more for level 5)

## 2023-06-03 NOTE — ED PROVIDER NOTES
I was present in the ED during this patient's evaluation and management by the Advance Practice Provider and was available to address any concerns about their medical management.     Nestor Nichols MD  Attending, Emergency Department       Shana Estevez MD  06/03/23 [id number]

## 2023-07-01 DIAGNOSIS — E61.2 MAGNESIUM DEFICIENCY: ICD-10-CM

## 2023-07-02 RX ORDER — LANOLIN ALCOHOL/MO/W.PET/CERES
CREAM (GRAM) TOPICAL
Qty: 120 TABLET | Refills: 1 | Status: SHIPPED | OUTPATIENT
Start: 2023-07-02

## 2023-07-17 DIAGNOSIS — J30.2 SEASONAL ALLERGIES: ICD-10-CM

## 2023-07-17 RX ORDER — LORATADINE 10 MG/1
TABLET ORAL
Qty: 30 TABLET | Refills: 3 | Status: SHIPPED | OUTPATIENT
Start: 2023-07-17

## 2023-08-15 RX ORDER — ESCITALOPRAM OXALATE 20 MG/1
TABLET ORAL
Qty: 90 TABLET | Refills: 1 | Status: SHIPPED | OUTPATIENT
Start: 2023-08-15

## 2023-09-10 DIAGNOSIS — E61.2 MAGNESIUM DEFICIENCY: ICD-10-CM

## 2023-09-11 RX ORDER — LANOLIN ALCOHOL/MO/W.PET/CERES
800 CREAM (GRAM) TOPICAL 2 TIMES DAILY
Qty: 120 TABLET | Refills: 1 | Status: SHIPPED | OUTPATIENT
Start: 2023-09-11

## 2023-09-21 DIAGNOSIS — K21.9 GASTROESOPHAGEAL REFLUX DISEASE WITHOUT ESOPHAGITIS: ICD-10-CM

## 2023-09-21 RX ORDER — PANTOPRAZOLE SODIUM 40 MG/1
40 TABLET, DELAYED RELEASE ORAL NIGHTLY
Qty: 90 TABLET | Refills: 1 | Status: SHIPPED | OUTPATIENT
Start: 2023-09-21

## 2023-10-18 ENCOUNTER — TELEPHONE (OUTPATIENT)
Dept: PRIMARY CARE CLINIC | Age: 58
End: 2023-10-18

## 2023-10-18 NOTE — TELEPHONE ENCOUNTER
Patient called in this afternoon asking if you could write her a letter excusing her from [employer] duty. Please advise.

## 2023-11-20 DIAGNOSIS — F41.9 ANXIETY: ICD-10-CM

## 2023-11-20 RX ORDER — BUSPIRONE HYDROCHLORIDE 10 MG/1
TABLET ORAL
Qty: 90 TABLET | Refills: 2 | Status: SHIPPED | OUTPATIENT
Start: 2023-11-20

## 2023-11-20 NOTE — TELEPHONE ENCOUNTER
Guy Chua is requesting a refill on the following medication(s):  Requested Prescriptions     Pending Prescriptions Disp Refills    busPIRone (BUSPAR) 10 MG tablet 90 tablet 2     Sig: Take 1 tab in morning and 2 tabs in evening       Last Visit Date (If Applicable):  2/53/9101    Next Visit Date:    Visit date not found - Is going through an insurance change. Once everything is settled with that, she will call back to set up an appointment.

## 2024-02-06 DIAGNOSIS — F41.9 ANXIETY: ICD-10-CM

## 2024-02-06 RX ORDER — BUSPIRONE HYDROCHLORIDE 10 MG/1
TABLET ORAL
Qty: 90 TABLET | Refills: 2 | Status: SHIPPED | OUTPATIENT
Start: 2024-02-06

## 2024-02-06 NOTE — TELEPHONE ENCOUNTER
Last visit: 4/18/23    Last Med refill: 11/2023  Does patient have enough medication for 72 hours: Next Visit Date:  No future appointments.    Health Maintenance   Topic Date Due    Hepatitis B vaccine (1 of 3 - 3-dose series) Never done    COVID-19 Vaccine (1) Never done    DTaP/Tdap/Td vaccine (1 - Tdap) Never done    Shingles vaccine (1 of 2) Never done    Breast cancer screen  04/01/2022    Flu vaccine (1) Never done    Depression Screen  03/02/2024    Colorectal Cancer Screen  03/17/2024    Lipids  03/02/2028    Hepatitis A vaccine  Aged Out    Hib vaccine  Aged Out    Polio vaccine  Aged Out    Meningococcal (ACWY) vaccine  Aged Out    Pneumococcal 0-64 years Vaccine  Aged Out    Depression Monitoring  Discontinued    Hepatitis C screen  Discontinued    HIV screen  Discontinued       No results found for: \"LABA1C\"          ( goal A1C is < 7)   No components found for: \"LABMICR\"  LDL Cholesterol (mg/dL)   Date Value   03/02/2023 148 (H)   05/30/2019 127       (goal LDL is <100)   AST (U/L)   Date Value   03/02/2023 22     ALT (U/L)   Date Value   03/02/2023 12     BUN (mg/dL)   Date Value   01/25/2022 8     BP Readings from Last 3 Encounters:   05/29/23 126/76   04/18/23 136/84   03/02/23 134/84          (goal 120/80)    All Future Testing planned in [facility]  Lab Frequency Next Occurrence               Patient Active Problem List:     Palpitation     Gastroesophageal reflux disease without esophagitis     Migraine without aura and without status migrainosus, not intractable     PAF (paroxysmal atrial fibrillation) (HCC)     Anxiety     Irritable bowel syndrome     Hypercholesterolemia     IVCD (intraventricular conduction defect)     Left bundle branch block     Hypomagnesemia

## 2024-03-20 DIAGNOSIS — K21.9 GASTROESOPHAGEAL REFLUX DISEASE WITHOUT ESOPHAGITIS: ICD-10-CM

## 2024-03-21 RX ORDER — PANTOPRAZOLE SODIUM 40 MG/1
40 TABLET, DELAYED RELEASE ORAL NIGHTLY
Qty: 90 TABLET | Refills: 1 | Status: SHIPPED | OUTPATIENT
Start: 2024-03-21

## 2024-03-21 NOTE — TELEPHONE ENCOUNTER
Last Visit Date: 4/18/2023   Next Visit Date: Visit date not found     Pt is currently paying out-of-pocket for medical and is unable to schedule an appt until she obtains coverage.

## 2024-05-23 DIAGNOSIS — F41.9 ANXIETY: ICD-10-CM

## 2024-05-23 RX ORDER — BUSPIRONE HYDROCHLORIDE 10 MG/1
TABLET ORAL
Qty: 90 TABLET | Refills: 2 | Status: SHIPPED | OUTPATIENT
Start: 2024-05-23

## 2024-05-23 NOTE — TELEPHONE ENCOUNTER
Ramila Pringle is calling to request a refill on the following medication(s):    Medication Request:  Requested Prescriptions     Pending Prescriptions Disp Refills    busPIRone (BUSPAR) 10 MG tablet [Pharmacy Med Name: BUSPIRONE HCL 10 MG TABLET] 90 tablet 2     Sig: take 1 tablet by mouth every morning and 2 tablets every evening       Last Visit Date (If Applicable):  4/18/2023    Next Visit Date:    Visit date not found

## 2024-11-05 ENCOUNTER — TELEPHONE (OUTPATIENT)
Dept: SURGERY | Age: 59
End: 2024-11-05

## 2024-11-13 ENCOUNTER — OFFICE VISIT (OUTPATIENT)
Dept: SURGERY | Age: 59
End: 2024-11-13
Payer: MEDICAID

## 2024-11-13 VITALS
HEART RATE: 74 BPM | DIASTOLIC BLOOD PRESSURE: 89 MMHG | SYSTOLIC BLOOD PRESSURE: 136 MMHG | HEIGHT: 64 IN | WEIGHT: 206.2 LBS | BODY MASS INDEX: 35.2 KG/M2

## 2024-11-13 DIAGNOSIS — R22.32 MASS OF ARM, LEFT: Primary | ICD-10-CM

## 2024-11-13 PROCEDURE — 99203 OFFICE O/P NEW LOW 30 MIN: CPT | Performed by: PLASTIC SURGERY

## 2024-11-13 NOTE — PROGRESS NOTES
surgical revision or treatment.    Obesity: If you're BMI is greater than 30 you may have a higher chance of complications.  This may include but not limited to wound healing and infections.  Also, if you have other medical problems such as diabetes and hypertension it may affect your healing as well as your surgical results in bleeding.  Damage to Deeper Structures- There is the potential for injury to deeper structures including nerves, blood vessels, muscles, and lungs (pneumothorax) during any surgical procedure.  The potential for this to occur varies according to where on the body surgery is being performed. Injury to deeper structures may be temporary or permanent.  Cancer- In some situations, a skin lesion or tumor that appears to be benign may be determined to be cancerous after laboratory analysis.  Additional treatments or surgery may be necessary.  Recurrence- In some situation, skin lesions and tumors can recur after surgical excision.  Additional treatment or secondary surgery may be necessary.  Skin Discoloration / Swelling- Some bruising and swelling normally occurs following surgery.  The skin in or near the surgical site can appear either lighter or darker than surrounding skin.  Although uncommon, swelling and skin discoloration may persist for long periods of time and, in rare situations, may be permanent.    Skin Sensitivity- Itching, tenderness, or exaggerated responses to hot or cold temperatures may occur after surgery.  Usually this resolves during healing, but in some situations, it may be chronic, permanent.  Pain- You will experience pain after your surgery.  Pain of varying intensity and duration may occur and persist after surgery.  Chronic pain may occur from nerves becoming trapped in scar tissue.  Sutures- Some surgical techniques use deep sutures.  You may notice these sutures after your surgery.  Sutures may spontaneously poke through the skin, become visible or produce irritation

## 2025-02-24 ENCOUNTER — HOSPITAL ENCOUNTER (OUTPATIENT)
Dept: ULTRASOUND IMAGING | Age: 60
Discharge: HOME | End: 2025-02-26
Attending: PLASTIC SURGERY
Payer: MEDICAID

## 2025-02-24 DIAGNOSIS — R22.32 MASS OF ARM, LEFT: ICD-10-CM

## 2025-02-24 PROCEDURE — 76882 US LMTD JT/FCL EVL NVASC XTR: CPT
